# Patient Record
Sex: FEMALE | Race: WHITE | NOT HISPANIC OR LATINO | Employment: OTHER | URBAN - METROPOLITAN AREA
[De-identification: names, ages, dates, MRNs, and addresses within clinical notes are randomized per-mention and may not be internally consistent; named-entity substitution may affect disease eponyms.]

---

## 2017-01-05 ENCOUNTER — OUTPATIENT CASE MANAGEMENT (OUTPATIENT)
Dept: ADMINISTRATIVE | Facility: OTHER | Age: 72
End: 2017-01-05

## 2017-01-05 NOTE — PROGRESS NOTES
1-5-17--Follow-up for OPCM completed. I spoke with patient. Patient reports that she is currently admitted into the [facility] since 12-9-16. Patient reports that she has been accepted to [facility] and she will be going their on Monday 1-9-17. Patient reports that she plans to enter into [facility] prior to her Right Hip replacement surgery scheduled with  at [facility] on 1-31-17. Patient reports that she was instructed that after her surgery she will be in the hospital for 3 days and then will transition to [facility] for Rehab.  Patient reports that she uses a walker to aid with ambulation. Patient reports that she has not had any falls. Patient reports that she can bear a little weight on her right leg, but not much. Patient reports that she does not have any S/S of UTI at this time. We went over S/S of UTI and when to notify MD.     Follow-up Plan:    -Continue to educate about UTI. Review signs and symptoms of UTI. Encourage patient to follow medication and treatment regimen. Encourage patient to maintain follow up with doctors.  -Follow-up with patient to see if she has entered into [facility].  -Is patient still scheduled for right hip surgery with Dr.Richard Gallardo on 1-31-17    Kristal RODRIGES CCM

## 2017-01-09 ENCOUNTER — TELEPHONE (OUTPATIENT)
Dept: INTERNAL MEDICINE | Facility: CLINIC | Age: 72
End: 2017-01-09

## 2017-01-09 NOTE — TELEPHONE ENCOUNTER
----- Message from Radha Magaña MA sent at 1/9/2017 11:36 AM CST -----  Contact: self - [phone]  Requesting a copy of her last chest xray results. Please fax to Attn: Anjel Wray [phone]. ([facility]). Thanks!

## 2017-01-11 ENCOUNTER — OFFICE VISIT (OUTPATIENT)
Dept: INTERNAL MEDICINE | Facility: CLINIC | Age: 72
End: 2017-01-11
Payer: MEDICARE

## 2017-01-11 VITALS
HEIGHT: 66 IN | SYSTOLIC BLOOD PRESSURE: 120 MMHG | WEIGHT: 175.25 LBS | HEART RATE: 93 BPM | BODY MASS INDEX: 28.16 KG/M2 | DIASTOLIC BLOOD PRESSURE: 72 MMHG

## 2017-01-11 DIAGNOSIS — Z01.818 PRE-OP EXAMINATION: Primary | ICD-10-CM

## 2017-01-11 DIAGNOSIS — M16.11 PRIMARY OSTEOARTHRITIS OF RIGHT HIP: ICD-10-CM

## 2017-01-11 DIAGNOSIS — J84.10 PULMONARY FIBROSIS: Chronic | ICD-10-CM

## 2017-01-11 DIAGNOSIS — I10 ESSENTIAL HYPERTENSION: ICD-10-CM

## 2017-01-11 PROCEDURE — 3078F DIAST BP <80 MM HG: CPT | Mod: S$GLB,,, | Performed by: INTERNAL MEDICINE

## 2017-01-11 PROCEDURE — 99499 UNLISTED E&M SERVICE: CPT | Mod: S$GLB,,, | Performed by: INTERNAL MEDICINE

## 2017-01-11 PROCEDURE — 3074F SYST BP LT 130 MM HG: CPT | Mod: S$GLB,,, | Performed by: INTERNAL MEDICINE

## 2017-01-11 PROCEDURE — 1157F ADVNC CARE PLAN IN RCRD: CPT | Mod: S$GLB,,, | Performed by: INTERNAL MEDICINE

## 2017-01-11 PROCEDURE — 99999 PR PBB SHADOW E&M-EST. PATIENT-LVL III: CPT | Mod: PBBFAC,,, | Performed by: INTERNAL MEDICINE

## 2017-01-11 PROCEDURE — 1160F RVW MEDS BY RX/DR IN RCRD: CPT | Mod: S$GLB,,, | Performed by: INTERNAL MEDICINE

## 2017-01-11 PROCEDURE — 99215 OFFICE O/P EST HI 40 MIN: CPT | Mod: S$GLB,,, | Performed by: INTERNAL MEDICINE

## 2017-01-11 PROCEDURE — 1159F MED LIST DOCD IN RCRD: CPT | Mod: S$GLB,,, | Performed by: INTERNAL MEDICINE

## 2017-01-11 PROCEDURE — 1126F AMNT PAIN NOTED NONE PRSNT: CPT | Mod: S$GLB,,, | Performed by: INTERNAL MEDICINE

## 2017-01-11 NOTE — PROGRESS NOTES
Subjective:       Patient ID: Josi Sidhu is a 72 y.o. female.    Chief Complaint: Follow-up  this 72-year-old woman is brought to the office today by a friend.  She was initially seen December 7  See note    THE PURPOSE OF TODAY'S VISIT IS TO PRE-OP CLEAR HER FOR SURGERY AT [facility]   ON January 31, 2017 FOR A RIGHT TOTAL HIP REPLACEMENT, UTILIZING AN ANTERIOR APPROACH WITH DR. PONCE MCGILL.    She looks like a totally new person.  After she was seen December 7, 2016,she entered skilled nursing at the [facility], which cost $6000 for the month and was worth every Elva.  She has improved substantially.    She got off oxycodone completely and very quickly with the use of a topical pain reliever in cream form along with oral Tylenol.      I wonder if she has fibromyalgia and taking narcotics increased her sensitivity to pain, Making her utterly miserable?    But no doubt about it, the side effects of narcotic pain pills were definitely doing her in.  She was constipated, which caused her difficulty emptying her bladder   and she had one urinary tract infection after the other.  Off the narcotics, she's been having a bowel movement every day.  She's not having any trouble emptying her bladder.  She doesn't have any stinging or burning on urination.  She's had no trouble with her bladder function and no lower urinary tract symptoms for the past month, off narcotics.    She has been daily in a vigorous rehabilitation program.    Although she cannot bear weight on her right leg because she has avascular necrosis of the right femoral head and destruction of her hip joint and is urgently in need of a right total hip replacement, Her overall performance status has improved dramatically.  She is independent with all activities of daily living.      So,Yesterday she left the skilled nursing at [facility]  and moved into [facility].  This only costs $3,000.00  dollars a month.   She is  going to have to do her rehabilitation exercises on her own.  She's got to keep her performance status up.  She has to force herself to drink water and empty her bladder regularly of voiding her bladder becoming overfilled.  She's got to watch her diet so that she remains regular and doesn't get constipated again.    Her blood pressure has been under very good control.  HPI  Review of Systems   Constitutional: Negative for activity change, appetite change, chills, fatigue, fever and unexpected weight change.   HENT: Negative for dental problem, hearing loss, tinnitus, trouble swallowing and voice change.    Eyes: Negative for visual disturbance.   Respiratory: Negative for cough, chest tightness, shortness of breath and wheezing.    Cardiovascular: Negative for chest pain, palpitations and leg swelling.   Gastrointestinal: Negative for abdominal pain, blood in stool, constipation, diarrhea and nausea.   Genitourinary: Negative for difficulty urinating, dysuria, frequency and urgency.   Musculoskeletal: Positive for arthralgias and gait problem. Negative for back pain, joint swelling, myalgias, neck pain and neck stiffness.   Skin: Negative for rash.   Neurological: Negative for dizziness, tremors, speech difficulty, weakness, light-headedness and headaches.   Psychiatric/Behavioral: Negative for dysphoric mood and sleep disturbance. The patient is not nervous/anxious.        Objective:      Physical Exam   Constitutional: She is oriented to person, place, and time. She appears well-developed and well-nourished. No distress.   HENT:   Head: Normocephalic and atraumatic.   Right Ear: External ear normal.   Left Ear: External ear normal.   Nose: Nose normal.   Mouth/Throat: Oropharynx is clear and moist. No oropharyngeal exudate.   Eyes: Conjunctivae and EOM are normal. Pupils are equal, round, and reactive to light. Right eye exhibits no discharge. No scleral icterus.   Neck: Normal range of motion and full passive  range of motion without pain. Neck supple. No spinous process tenderness and no muscular tenderness present. Carotid bruit is not present. No thyromegaly present.   Cardiovascular: Normal rate, regular rhythm, S1 normal, S2 normal and normal heart sounds.  Exam reveals no gallop and no friction rub.    No murmur heard.  Pulmonary/Chest: Effort normal. No respiratory distress. She has no wheezes. She has rales. She exhibits no tenderness.   She has a strikingly abnormal pulmonary examination with dry crackles throughout both lungs from the bases to the apices.   Abdominal: Soft. Bowel sounds are normal. She exhibits no distension and no mass. There is no tenderness. There is no rebound and no guarding.   Genitourinary: Pelvic exam was performed with patient supine. Uterus is not deviated, not enlarged, not fixed and not tender. Cervix exhibits no motion tenderness, no discharge and no friability. Right adnexum displays no mass, no tenderness and no fullness. Left adnexum displays no mass, no tenderness and no fullness.   Musculoskeletal: Normal range of motion. She exhibits no edema or tenderness.   Lymphadenopathy:        Head (right side): No submental and no submandibular adenopathy present.        Head (left side): No submental and no submandibular adenopathy present.     She has no cervical adenopathy.        Right cervical: No superficial cervical, no deep cervical and no posterior cervical adenopathy present.       Left cervical: No superficial cervical, no deep cervical and no posterior cervical adenopathy present.        Right axillary: No pectoral and no lateral adenopathy present.        Left axillary: No pectoral and no lateral adenopathy present.       Right: No supraclavicular adenopathy present.        Left: No supraclavicular adenopathy present.   Neurological: She is alert and oriented to person, place, and time. She has normal reflexes. No cranial nerve deficit. She exhibits normal muscle tone.  Coordination normal.   Skin: Skin is warm and dry. No rash noted.   Psychiatric: She has a normal mood and affect. Her behavior is normal. Her mood appears not anxious. Her speech is not rapid and/or pressured. She is not agitated. She does not exhibit a depressed mood.   Normal behavior, thought content, insight and judgement.   Vitals reviewed.      Assessment:       1. Pre-op examination, Dr. Ata Gallardo, at [facility] 01-    2. Primary osteoarthritis of right hip, avascular necrosis, cause unknown, end stage    3. Pulmonary fibrosis, diagnosis 2010    4. Essential hypertension        Plan:   Josi was seen today for follow-up.    Diagnoses and all orders for this visit:    Pre-op examination, Dr. Ata Gallardo, at [facility] 01-.  See below.  All in all, I think she is at an acceptable risk to proceed with right total hip replacement as soon as possible.  I do think a pulmonary evaluation would be valuable pre-op.    Primary osteoarthritis of right hip, avascular necrosis, cause unknown, end stage.   This has become a serious, quality of life issue and it is imperative that the patient have her right hip replaced as soon as possible.    She is wheelchair bound for the most part because of this hip.   She can partially weight-bear using a walker.    She is not able to live independently.      She just moved into an assisted living facility yesterday and I had to fill out paperwork for that institution, done at this visit.    Pulmonary fibrosis, diagnosis 2010  Although the patient has a chest x-ray which is scary to look atAnd an incredibly abnormal pulmonary examination, she is amazingly asymptomatic.  She just finished a strenuous rehabilitation program and reportsno significant dyspnea on exertion, Tachycardia on exertion, Lightheadedness on exertion or blood pressure changes on exertion.    She has no nocturnal symptoms.    She sleeps well at night when she gets comfortable with her right hip.  She has  an appointment on Friday, January 13 with Dr. Ata Gallardo her orthopedic surgeon who will perform her right total hip replacement.  I think it would be best for her to have a pulmonary evaluation, Pulmonary function test, arterial blood gases on room airand pulse oximetry prior to her surgery at the institution, [facility], where she is going to have her surgery performed.    I think arterial blood gases on room air would be very valuable pre-op.  -     Complete PFT with bronchodilator; Future    Essential hypertension, Well controlled.  Continue same medications.  Monitor blood pressure regularly at least twice weekly.

## 2017-01-11 NOTE — MR AVS SNAPSHOT
[facility]  [address]  [facility] [address]  Phone: [phone]  Fax: [phone]                  Josi Albertseverett   2017 3:20 PM   Office Visit    Description:  Female : 1945   Provider:  Hoa Orantes MD   Department:  [facility]           Reason for Visit     Follow-up           Diagnoses this Visit        Comments    Pre-op examination    -  Primary     Primary osteoarthritis of right hip         Pulmonary fibrosis         Essential hypertension                To Do List           Goals (5 Years of Data)              17    Patient/caregiver will have an action plan in place to manage and prevent complications of UTI  prior to discharge from OPCM.   On track    Notes - Note created  2016  2:25 PM by Aiyana Gomez RN    Overall Time to Completion  1 month from 2016    Short Term Goals  Patient/caregiver will verbalize 2 signs and symptoms of UTI within 2 weeks.   Interventions   · Recognize and provide educational material (KRYSTAL).   Status  · Partially met          Patient/caregiver will have appropriate level of care prior to discharge from OPCM.   Improving    Notes - Note created  2016  2:27 PM by Aiyana Gomez RN    Overall Time to Completion  1 month from 2016    Short Term Goals  Patient/caregiver will identify 2 supports or services to maintain or improve current functional status within 2 weeks.  Interventions   · Assist as needed with process for gaining residence within and Assisted living    Status  · Partially met              [facility]     [facility] -  Assistance  Registered nurses in the [facility] provide clinical advisement, health education, appointment booking, and other advisory services.  Call for this free service at [phone].             Medications           Message regarding Medications     Verify the changes and/or additions to  "your medication regime listed below are the same as discussed with your clinician today.  If any of these changes or additions are incorrect, please notify your healthcare provider.        STOP taking these medications     oxycodone (ROXICODONE) 5 MG immediate release tablet Take 1 tablet (5 mg total) by mouth every 6 (six) hours as needed.           Verify that the below list of medications is an accurate representation of the medications you are currently taking.  If none reported, the list may be blank. If incorrect, please contact your healthcare provider. Carry this list with you in case of emergency.           Current Medications     acetaminophen (TYLENOL) 500 MG tablet Take 2 tablets (1,000 mg total) by mouth every 8 (eight) hours as needed for Pain.    amlodipine (NORVASC) 5 MG tablet Take 1 tablet (5 mg total) by mouth every morning.    cyanocobalamin (VITAMIN B-12) 1000 MCG tablet Take 1,000 mcg by mouth once daily.    lisinopril (PRINIVIL,ZESTRIL) 40 MG tablet Take 1 tablet (40 mg total) by mouth once daily.    omeprazole (PRILOSEC) 20 MG capsule Take 1 capsule (20 mg total) by mouth 2 (two) times daily. 1 Capsule, Delayed Release(E.C.) Oral Twice a day    trazodone (DESYREL) 50 MG tablet Take 50 mg by mouth every evening.    vitamin D (VITAMIN D3) 1000 units Tab Take 1,000 Units by mouth 2 (two) times daily.    CMPD ketamine 10%- baclofen 2%- cyclobenzaprine 2%- cascade diclofenac 3%- gabapentin 10%- bupivacaine 2% in lipoderm cream Apply topically 1 to 2 grams to the affected area 3 to 4 times daily           Clinical Reference Information           Vital Signs - Last Recorded  Most recent update: 1/11/2017  3:51 PM by Sapna العراقي MA    BP Pulse Ht Wt LMP BMI    120/72 93 5' 6" (1.676 m) 79.5 kg (175 lb 4.3 oz) (LMP Unknown) 28.29 kg/m2      Blood Pressure          Most Recent Value    BP  120/72      Allergies as of 1/11/2017     Asa [Aspirin]    Nsaids (Non-steroidal Anti-inflammatory Drug) "      Immunizations Administered on Date of Encounter - 1/11/2017     None      Orders Placed During Today's Visit     Future Labs/Procedures Expected by Expires    Complete PFT with bronchodilator  As directed 4/11/2017

## 2017-01-12 ENCOUNTER — TELEPHONE (OUTPATIENT)
Dept: INTERNAL MEDICINE | Facility: CLINIC | Age: 72
End: 2017-01-12

## 2017-01-19 ENCOUNTER — OUTPATIENT CASE MANAGEMENT (OUTPATIENT)
Dept: ADMINISTRATIVE | Facility: OTHER | Age: 72
End: 2017-01-19

## 2017-01-19 NOTE — PROGRESS NOTES
1-19-17-Attempt to follow-up for Outpatient Care Management; left message requesting return call.         Follow-up Plan:    -Continue to educate about UTI. Review signs and symptoms of UTI. Encourage patient to follow medication and treatment regimen. Encourage patient to maintain follow up with doctors.  -Follow-up with patient to see if [facility] entered into [facility].  -Is patient still scheduled for right hip surgery with Dr.Richard Gallardo on 1-31-17    Kristal RODRIGES CCM

## 2017-01-23 ENCOUNTER — OUTPATIENT CASE MANAGEMENT (OUTPATIENT)
Dept: ADMINISTRATIVE | Facility: OTHER | Age: 72
End: 2017-01-23

## 2017-01-23 NOTE — PROGRESS NOTES
1-23-17--Follow-up for OPCM completed. I spoke with patient. Patient reports that she is currently admitted into the [facility].  Patient reports that she plans to [facility] into [facility] prior to her Right Hip replacement surgery scheduled with  at [facility] on 1-31-17. Patient reports that she was instructed that after her surgery she will be in the hospital for 3 days and then will transition to [facility] for Rehab.  Patient reports that she uses a walker to aid with ambulation. Patient reports that she has not had any falls. Patient reports that she can bear a little weight on her right leg, but not much. Patient reports that she does not have any S/S of UTI at this time. We went over S/S of UTI and when to notify MD.     Follow-up Plan:    -Continue to educate about UTI. Review signs and symptoms of UTI. Encourage patient to follow medication and treatment regimen. Encourage patient to maintain follow up with doctors.  -Follow-up with patient to see if [facility] has [facility] into [facility].  -Is patient still scheduled for right hip surgery with Dr.Richard Gallardo on 1-31-17    Kristal RODRIGES CCM

## 2017-02-13 ENCOUNTER — TELEPHONE (OUTPATIENT)
Dept: ORTHOPEDICS | Facility: CLINIC | Age: 72
End: 2017-02-13

## 2017-02-13 NOTE — TELEPHONE ENCOUNTER
----- Message from Marzena Simon MA sent at 2/13/2017  1:32 PM CST -----  Contact: jessica( [facility]) [phone]  I think this is an [facility] patient.     ----- Message -----     From: Kathleen Berman     Sent: 2/13/2017   1:25 PM       To: Sami Raygoza S Staff    She is calling to see if the dreesinf should be changed on the pt  visit.

## 2017-02-15 ENCOUNTER — OUTPATIENT CASE MANAGEMENT (OUTPATIENT)
Dept: ADMINISTRATIVE | Facility: OTHER | Age: 72
End: 2017-02-15

## 2017-02-15 NOTE — PROGRESS NOTES
2-15-17--Attempt to follow-up for Outpatient Care Management; left message requesting return call.       Follow-up Plan:    -Continue to educate about UTI. Review signs and symptoms of UTI. Encourage patient to follow medication and treatment regimen. Encourage patient to maintain follow up with doctors.  -Follow-up with patient to see if she has entered into [facility].  -Is patient still scheduled for right hip surgery with Dr.Richard Gallardo on 1-31-17    Kristal RODRIGES CCM

## 2017-02-20 ENCOUNTER — OUTPATIENT CASE MANAGEMENT (OUTPATIENT)
Dept: ADMINISTRATIVE | Facility: OTHER | Age: 72
End: 2017-02-20

## 2017-02-20 NOTE — PROGRESS NOTES
2-20-17--2nd Attempt to follow-up for Outpatient Care Management; I spoke briefly with patient. Patient is at the doctors and request return call at later date.I will mail a letter with my contact information.Kristal RODRIGES [facility]        Follow-up Plan:    -Continue to educate about UTI. Review signs and symptoms of UTI. Encourage patient to follow medication and treatment regimen. Encourage patient to maintain follow up with doctors.  -Follow-up with patient to see if she has entered into [facility].  -Is patient still scheduled for right hip surgery with Dr.Richard Gallardo on 1-31-17    Krisatl RODRIGES [facility]

## 2017-03-01 ENCOUNTER — OUTPATIENT CASE MANAGEMENT (OUTPATIENT)
Dept: ADMINISTRATIVE | Facility: OTHER | Age: 72
End: 2017-03-01

## 2017-03-01 NOTE — PROGRESS NOTES
3-1-17--Follow-up for OPCM completed. I spoke with patient. Patient reports that she is doing fine since her Right hip surgery on 1-31-17. Patient reports that she has moved to new apartments on [facility] [facility]. Patient reports that [facility] Patient reports that she will continue with therapy at [facility] once with Home health is discontinued.           Follow-up Plan:    Case closed    Kristal RODRIGES CCM

## 2017-04-05 ENCOUNTER — PATIENT MESSAGE (OUTPATIENT)
Dept: INTERNAL MEDICINE | Facility: CLINIC | Age: 72
End: 2017-04-05

## 2017-04-05 RX ORDER — TRAZODONE HYDROCHLORIDE 50 MG/1
50 TABLET ORAL NIGHTLY
Qty: 90 TABLET | Refills: 3 | Status: SHIPPED | OUTPATIENT
Start: 2017-04-05 | End: 2018-01-31 | Stop reason: SDUPTHER

## 2017-07-10 RX ORDER — OMEPRAZOLE 20 MG/1
CAPSULE, DELAYED RELEASE ORAL
Qty: 180 CAPSULE | Refills: 3 | Status: SHIPPED | OUTPATIENT
Start: 2017-07-10 | End: 2017-10-11 | Stop reason: SDUPTHER

## 2017-07-29 ENCOUNTER — PATIENT MESSAGE (OUTPATIENT)
Dept: INTERNAL MEDICINE | Facility: CLINIC | Age: 72
End: 2017-07-29

## 2017-07-29 DIAGNOSIS — I10 HYPERTENSION, BENIGN: ICD-10-CM

## 2017-08-01 RX ORDER — LISINOPRIL 40 MG/1
40 TABLET ORAL DAILY
Qty: 90 TABLET | Refills: 3 | Status: SHIPPED | OUTPATIENT
Start: 2017-08-01 | End: 2017-10-11 | Stop reason: ALTCHOICE

## 2017-08-01 RX ORDER — AMLODIPINE BESYLATE 5 MG/1
5 TABLET ORAL EVERY MORNING
Qty: 90 TABLET | Refills: 3 | Status: SHIPPED | OUTPATIENT
Start: 2017-08-01 | End: 2017-10-11 | Stop reason: SDUPTHER

## 2017-10-02 ENCOUNTER — TELEPHONE (OUTPATIENT)
Dept: INTERNAL MEDICINE | Facility: CLINIC | Age: 72
End: 2017-10-02

## 2017-10-02 NOTE — TELEPHONE ENCOUNTER
----- Message from Joan Jeffrey sent at 10/2/2017  3:37 PM CDT -----  Contact: Pt  Pt is requesting to schedule a visit says she is not feeling well    Pt can be reached at [phone]    Thanks

## 2017-10-03 ENCOUNTER — TELEPHONE (OUTPATIENT)
Dept: INTERNAL MEDICINE | Facility: CLINIC | Age: 72
End: 2017-10-03

## 2017-10-03 ENCOUNTER — HOSPITAL ENCOUNTER (OUTPATIENT)
Dept: RADIOLOGY | Facility: HOSPITAL | Age: 72
Discharge: HOME | End: 2017-10-03
Attending: HOSPITALIST
Payer: MEDICARE

## 2017-10-03 ENCOUNTER — OFFICE VISIT (OUTPATIENT)
Dept: INTERNAL MEDICINE | Facility: CLINIC | Age: 72
End: 2017-10-03
Payer: MEDICARE

## 2017-10-03 VITALS
WEIGHT: 178.38 LBS | OXYGEN SATURATION: 97 % | BODY MASS INDEX: 28.67 KG/M2 | HEART RATE: 96 BPM | DIASTOLIC BLOOD PRESSURE: 60 MMHG | TEMPERATURE: 98 F | SYSTOLIC BLOOD PRESSURE: 109 MMHG | HEIGHT: 66 IN

## 2017-10-03 DIAGNOSIS — R05.9 COUGH: ICD-10-CM

## 2017-10-03 DIAGNOSIS — J84.10 PULMONARY FIBROSIS: Chronic | ICD-10-CM

## 2017-10-03 DIAGNOSIS — I10 ESSENTIAL HYPERTENSION: ICD-10-CM

## 2017-10-03 DIAGNOSIS — R05.9 COUGH: Primary | ICD-10-CM

## 2017-10-03 DIAGNOSIS — R35.0 URINARY FREQUENCY: ICD-10-CM

## 2017-10-03 PROCEDURE — 71020 XR CHEST PA AND LATERAL: CPT | Mod: TC

## 2017-10-03 PROCEDURE — 71020 XR CHEST PA AND LATERAL: CPT | Mod: 26,,, | Performed by: RADIOLOGY

## 2017-10-03 PROCEDURE — 99213 OFFICE O/P EST LOW 20 MIN: CPT | Mod: GC,S$GLB,, | Performed by: HOSPITALIST

## 2017-10-03 PROCEDURE — 99999 PR PBB SHADOW E&M-EST. PATIENT-LVL IV: CPT | Mod: PBBFAC,GC,, | Performed by: HOSPITALIST

## 2017-10-03 RX ORDER — LEVOFLOXACIN 750 MG/1
750 TABLET ORAL DAILY
Qty: 7 TABLET | Refills: 0 | Status: SHIPPED | OUTPATIENT
Start: 2017-10-03 | End: 2017-10-11 | Stop reason: ALTCHOICE

## 2017-10-03 NOTE — PROGRESS NOTES
Subjective:       Patient ID: Josi Sidhu is a 72 y.o. female.    Chief Complaint: Cough and Extremity Weakness    71 y/o F with hx of pulmonary fibrosis and HTN who presents as an urgent care visit for 6 day hx of cough. Cough started on 9/27, non-productive, non-bloody, progressively worsening. Associated with some mild rhinorrhea , night sweats, malaise/ fatigue. No associated fevers. No lightheadedness or dizziness. Has baseline SOB and MONAE which has not worsened during this time frame.  Developed diarrhea for a couple of days that resolved with OTC meds. Took bactrim (that was left over from prior UTI treatment) for 4 days, with no significant improvement. Had no taken other OTC cough meds. On lisinopril for 30 years. Diarrhea started prior to abx. Had some urinary frequency with no dysuria today.       Review of Systems   Constitutional: Positive for activity change, appetite change, chills and fatigue. Negative for fever and unexpected weight change.   HENT: Negative for congestion, postnasal drip, sinus pressure and sore throat.    Eyes: Negative for visual disturbance.   Respiratory: Positive for cough and shortness of breath. Negative for wheezing.    Cardiovascular: Negative for chest pain, palpitations and leg swelling.   Gastrointestinal: Positive for abdominal pain and diarrhea. Negative for abdominal distention, nausea and vomiting.   Genitourinary: Positive for flank pain and frequency. Negative for decreased urine volume, difficulty urinating and dysuria.   Musculoskeletal: Negative for arthralgias and myalgias.   Neurological: Negative for dizziness, syncope, weakness and light-headedness.   Psychiatric/Behavioral: Negative for decreased concentration and dysphoric mood.       Past Medical History:   Diagnosis Date    History of tobacco abuse     remote, quit 1983, smoked PPD 13 days    Hyperlipidemia     Hypertension     since age 39    Pulmonary fibrosis         Recent  "childbirth      age age [age] normal, c-sec, cord     Past Surgical History:   Procedure Laterality Date    CATARACT EXTRACTION      both done but five years apart     SECTION      x2    ELBOW SURGERY Right 1995    fractured         Objective:     /60   Pulse 96   Temp 97.9 °F (36.6 °C)   Ht 5' 6" (1.676 m)   Wt 80.9 kg (178 lb 5.6 oz)   LMP  (LMP Unknown)   SpO2 97%   BMI 28.79 kg/m²     Physical Exam   Constitutional: She is oriented to person, place, and time. She appears well-developed and well-nourished. No distress.   Intermittently coughing    HENT:   Head: Normocephalic and atraumatic.   Right Ear: External ear normal.   Left Ear: External ear normal.   Eyes: EOM are normal. Pupils are equal, round, and reactive to light.   Neck: Normal range of motion. Neck supple.   Cardiovascular: Normal rate, regular rhythm, normal heart sounds and intact distal pulses.    No murmur heard.  Pulmonary/Chest: No respiratory distress. She has no wheezes. She has rales.   Diffuse bilateral Velcro crackles , worse at lung bases (per pt, has been noted on PE in the past)    Abdominal: Soft. Bowel sounds are normal. She exhibits no distension. There is no tenderness.   Musculoskeletal: Normal range of motion. She exhibits no edema, tenderness or deformity.   Lymphadenopathy:     She has no cervical adenopathy.   Neurological: She is alert and oriented to person, place, and time. No cranial nerve deficit.   Skin: Skin is warm and dry. No rash noted. She is not diaphoretic.   Psychiatric: She has a normal mood and affect.       Assessment:       1. Cough    2. Pulmonary fibrosis, diagnosis     3. Essential hypertension    4. Urinary frequency        Plan:       1. Cough  - ddx infectious (viral vs bacterial such as CAP) vs less likely ACEI adverse effects   - X-Ray Chest PA And Lateral; Future  - levoFLOXacin (LEVAQUIN) 750 MG tablet; Take 1 tablet (750 mg total) by mouth once daily.  Dispense: 7 " tablet; Refill: 0  - CBC auto differential  - dextromethorphan-guaifenesin  mg (MUCINEX DM)  mg per 12 hr tablet; Take 1 tablet by mouth 2 (two) times daily.  - pt advised to seek help in the ED if develops worsening symptoms, increasing SOB, fevers, or worsening fatigeu     2. Pulmonary fibrosis, diagnosis 2010  - X-Ray Chest PA And Lateral; Future  - CBC auto differential; Future    3. Essential hypertension  - cont amlodipine 5mg  - cont lisinopril 40 mg at this time  - pt advised to check BP at home and hold meds if SBP at 100 or lower    4. Urinary frequency  - clinic POCT U[facility] wnl    Discussed with Dr Caro    F/u with PCP (Rolanda) or resident clinic in 1 week    aSrah Coyle MD  Internal Medicine PGY-3  Pager [phone]

## 2017-10-04 ENCOUNTER — TELEPHONE (OUTPATIENT)
Dept: INTERNAL MEDICINE | Facility: CLINIC | Age: 72
End: 2017-10-04

## 2017-10-04 NOTE — TELEPHONE ENCOUNTER
"Called pt regarding lab work and CXR    CXR showing "Chronic lung disease slowly worsening since chest radiographs dated 2011 and 2009. " no evidence of a consolidation  CBC with normal WBC , 19% monocytes  She picked up her Marlyn Coyle MD  Internal Medicine PGY-3  Pager [phone]      "

## 2017-10-10 NOTE — PROGRESS NOTES
Preceptor note    Patient's history and physical discussed, please refer to resident physician's note for specific details. Medical record reviewed. I agree with resident's assessment and plan.

## 2017-10-11 ENCOUNTER — OFFICE VISIT (OUTPATIENT)
Dept: INTERNAL MEDICINE | Facility: CLINIC | Age: 72
End: 2017-10-11
Payer: MEDICARE

## 2017-10-11 ENCOUNTER — IMMUNIZATION (OUTPATIENT)
Dept: INTERNAL MEDICINE | Facility: CLINIC | Age: 72
End: 2017-10-11
Payer: MEDICARE

## 2017-10-11 VITALS
HEART RATE: 88 BPM | HEIGHT: 66 IN | WEIGHT: 176.56 LBS | SYSTOLIC BLOOD PRESSURE: 100 MMHG | DIASTOLIC BLOOD PRESSURE: 64 MMHG | OXYGEN SATURATION: 91 % | BODY MASS INDEX: 28.38 KG/M2

## 2017-10-11 DIAGNOSIS — R05.9 COUGH: ICD-10-CM

## 2017-10-11 DIAGNOSIS — Z78.0 POSTMENOPAUSAL STATUS: ICD-10-CM

## 2017-10-11 DIAGNOSIS — Z96.641 H/O TOTAL HIP ARTHROPLASTY, RIGHT: ICD-10-CM

## 2017-10-11 DIAGNOSIS — I10 ESSENTIAL HYPERTENSION: ICD-10-CM

## 2017-10-11 DIAGNOSIS — I10 HYPERTENSION, BENIGN: ICD-10-CM

## 2017-10-11 DIAGNOSIS — R05.8 COUGH DUE TO ACE INHIBITOR: ICD-10-CM

## 2017-10-11 DIAGNOSIS — E78.2 MIXED HYPERLIPIDEMIA: Chronic | ICD-10-CM

## 2017-10-11 DIAGNOSIS — Z12.31 ENCOUNTER FOR SCREENING MAMMOGRAM FOR MALIGNANT NEOPLASM OF BREAST: ICD-10-CM

## 2017-10-11 DIAGNOSIS — T46.4X5A COUGH DUE TO ACE INHIBITOR: ICD-10-CM

## 2017-10-11 DIAGNOSIS — J84.10 PULMONARY FIBROSIS: Primary | Chronic | ICD-10-CM

## 2017-10-11 PROCEDURE — 99214 OFFICE O/P EST MOD 30 MIN: CPT | Mod: 25,S$GLB,, | Performed by: INTERNAL MEDICINE

## 2017-10-11 PROCEDURE — 99499 UNLISTED E&M SERVICE: CPT | Mod: S$GLB,,, | Performed by: INTERNAL MEDICINE

## 2017-10-11 PROCEDURE — 90662 IIV NO PRSV INCREASED AG IM: CPT | Mod: S$GLB,,, | Performed by: INTERNAL MEDICINE

## 2017-10-11 PROCEDURE — 94640 AIRWAY INHALATION TREATMENT: CPT | Mod: S$GLB,,, | Performed by: INTERNAL MEDICINE

## 2017-10-11 PROCEDURE — 99999 PR PBB SHADOW E&M-EST. PATIENT-LVL IV: CPT | Mod: PBBFAC,,, | Performed by: INTERNAL MEDICINE

## 2017-10-11 PROCEDURE — G0008 ADMIN INFLUENZA VIRUS VAC: HCPCS | Mod: S$GLB,,, | Performed by: INTERNAL MEDICINE

## 2017-10-11 RX ORDER — ALBUTEROL SULFATE 0.83 MG/ML
2.5 SOLUTION RESPIRATORY (INHALATION)
Status: DISCONTINUED | OUTPATIENT
Start: 2017-10-11 | End: 2017-10-11 | Stop reason: SDUPTHER

## 2017-10-11 RX ORDER — IRBESARTAN 300 MG/1
300 TABLET ORAL NIGHTLY
Qty: 90 TABLET | Refills: 3 | Status: SHIPPED | OUTPATIENT
Start: 2017-10-11 | End: 2018-09-19 | Stop reason: SDUPTHER

## 2017-10-11 RX ORDER — ALBUTEROL SULFATE 2.5 MG/.5ML
2.5 SOLUTION RESPIRATORY (INHALATION)
Status: SHIPPED | OUTPATIENT
Start: 2017-10-11

## 2017-10-11 RX ORDER — OMEPRAZOLE 20 MG/1
20 CAPSULE, DELAYED RELEASE ORAL EVERY MORNING
Qty: 90 CAPSULE | Refills: 3 | Status: SHIPPED | OUTPATIENT
Start: 2017-10-11 | End: 2017-11-15 | Stop reason: SDUPTHER

## 2017-10-11 RX ORDER — AMLODIPINE BESYLATE 5 MG/1
5 TABLET ORAL EVERY MORNING
Qty: 90 TABLET | Refills: 3 | Status: SHIPPED | OUTPATIENT
Start: 2017-10-11 | End: 2019-01-09 | Stop reason: SDUPTHER

## 2017-10-11 RX ADMIN — ALBUTEROL SULFATE 2.5 MG: 0.83 SOLUTION RESPIRATORY (INHALATION) at 01:10

## 2017-10-11 NOTE — PROGRESS NOTES
Subjective:       Patient ID: Josi Sidhu is a 72 y.o. female.    Chief Complaint: Follow-up  She is definitely having more trouble with shortness of breath.    We tried an albuterol per nebulizer treatment in the office but she did not notice any benefit.    She's doing a beautiful job recovering from the right total hip replacement.    She has moved into [facility] on [facility].  It has been a good move.  HPI  Review of Systems   Constitutional: Negative for activity change, appetite change, chills, fatigue, fever and unexpected weight change.   HENT: Negative for dental problem, hearing loss, tinnitus, trouble swallowing and voice change.    Eyes: Negative for visual disturbance.   Respiratory: Positive for cough and shortness of breath. Negative for chest tightness and wheezing.    Cardiovascular: Negative for chest pain, palpitations and leg swelling.   Gastrointestinal: Negative for abdominal pain, blood in stool, constipation, diarrhea and nausea.   Genitourinary: Negative for difficulty urinating, dysuria, frequency and urgency.   Musculoskeletal: Negative for arthralgias, back pain, gait problem, joint swelling, myalgias, neck pain and neck stiffness.   Skin: Negative for rash.   Neurological: Negative for dizziness, tremors, speech difficulty, weakness, light-headedness and headaches.   Psychiatric/Behavioral: Negative for dysphoric mood and sleep disturbance. The patient is not nervous/anxious.        Objective:      Physical Exam   Constitutional: She is oriented to person, place, and time. She appears well-developed and well-nourished. No distress.   HENT:   Head: Normocephalic and atraumatic.   Right Ear: External ear normal.   Left Ear: External ear normal.   Nose: Nose normal.   Mouth/Throat: Oropharynx is clear and moist. No oropharyngeal exudate.   Eyes: Conjunctivae and EOM are normal. Pupils are equal, round, and reactive to light. Right eye exhibits no discharge. No scleral  icterus.   Neck: Normal range of motion and full passive range of motion without pain. Neck supple. No spinous process tenderness and no muscular tenderness present. Carotid bruit is not present. No thyromegaly present.   Cardiovascular: Normal rate, regular rhythm, S1 normal, S2 normal, normal heart sounds and intact distal pulses.  Exam reveals no gallop and no friction rub.    No murmur heard.  Pulmonary/Chest: Effort normal. No respiratory distress. She has no wheezes. She has rales. She exhibits no tenderness.   She has an incredibly abnormal pulmonary examination consistent with advanced pulmonary fibrosis from the bases to the apices bilaterally.  No wheezes are heard.  She is not obviously short of breath at rest.   Abdominal: Soft. Bowel sounds are normal. She exhibits no distension and no mass. There is no tenderness. There is no rebound and no guarding.   Genitourinary: Pelvic exam was performed with patient supine. Uterus is not deviated, not enlarged, not fixed and not tender. Cervix exhibits no motion tenderness, no discharge and no friability. Right adnexum displays no mass, no tenderness and no fullness. Left adnexum displays no mass, no tenderness and no fullness.   Musculoskeletal: Normal range of motion. She exhibits no edema or tenderness.   Lymphadenopathy:        Head (right side): No submental and no submandibular adenopathy present.        Head (left side): No submental and no submandibular adenopathy present.     She has no cervical adenopathy.        Right cervical: No superficial cervical, no deep cervical and no posterior cervical adenopathy present.       Left cervical: No superficial cervical, no deep cervical and no posterior cervical adenopathy present.        Right axillary: No pectoral and no lateral adenopathy present.        Left axillary: No pectoral and no lateral adenopathy present.       Right: No supraclavicular adenopathy present.        Left: No supraclavicular adenopathy  present.   Neurological: She is alert and oriented to person, place, and time. She has normal reflexes. No cranial nerve deficit. She exhibits normal muscle tone. Coordination normal.   Skin: Skin is warm and dry. No rash noted.   Psychiatric: She has a normal mood and affect. Her behavior is normal. Her mood appears not anxious. Her speech is not rapid and/or pressured. She is not agitated. She does not exhibit a depressed mood.   Normal behavior, thought content, insight and judgement.       Assessment:       1. Pulmonary fibrosis, diagnosis 2010    2. H/O total hip arthroplasty, right, 01-    3. Mixed hyperlipidemia    4. Essential hypertension    5. Cough    6. Hypertension, benign    7. Cough due to ACE inhibitor?    8. Encounter for screening mammogram for malignant neoplasm of breast    9. Postmenopausal status        Plan:   Josi was seen today for follow-up.    Diagnoses and all orders for this visit:    Pulmonary fibrosis, diagnosis 2010.  She will consider oxygen replacement therapy, other therapies.  However,the bronchodilator did not appear to be of any benefit.  -     Ambulatory Referral to Pulmonology  -     Discontinue: albuterol nebulizer solution 2.5 mg; Take 3 mLs (2.5 mg total) by nebulization one time.    H/O total hip arthroplasty, right, 01-.  Congratulations on a Rapid  Recovery.    Mixed hyperlipidemia  Encouraged continued compliance with diet, medication and lifestyle.    Essential hypertension.  Blood pressure might be a little lo w   on average, Now that her hip pain has resolved.      Hypertension, benign  -     amlodipine (NORVASC) 5 MG tablet; Take 1 tablet (5 mg total) by mouth every morning.    Cough due to ACE inhibitor?She definitely has a cough.  It is possible that lisinopril could be contributing.  In an attempt to reduce her symptoms lisinopril 40 mg daily will be discontinued and irbesartan started.    Encounter for screening mammogram for malignant neoplasm of  breast  -     Mammo Digital Screening Bilat with CAD; Future    Postmenopausal status  -     DXA Bone Density Spine And Hip; Future    Other orders  -     irbesartan (AVAPRO) 300 MG tablet; Take 1 tablet (300 mg total) by mouth every evening.  -     omeprazole (PRILOSEC) 20 MG capsule; Take 1 capsule (20 mg total) by mouth every morning.  -     albuterol sulfate nebulizer solution 2.5 mg; Take 2.5 mg by nebulization one time.    Return in about 4 weeks (around 11/8/2017) for BP check OFF ace ME OR INCHOLAS.

## 2017-11-08 ENCOUNTER — HOSPITAL ENCOUNTER (OUTPATIENT)
Dept: RADIOLOGY | Facility: HOSPITAL | Age: 72
Discharge: HOME | End: 2017-11-08
Attending: INTERNAL MEDICINE
Payer: MEDICARE

## 2017-11-08 ENCOUNTER — OFFICE VISIT (OUTPATIENT)
Dept: INTERNAL MEDICINE | Facility: CLINIC | Age: 72
End: 2017-11-08
Payer: MEDICARE

## 2017-11-08 VITALS
SYSTOLIC BLOOD PRESSURE: 122 MMHG | OXYGEN SATURATION: 96 % | BODY MASS INDEX: 29.09 KG/M2 | HEIGHT: 66 IN | DIASTOLIC BLOOD PRESSURE: 80 MMHG | WEIGHT: 181 LBS | HEART RATE: 77 BPM

## 2017-11-08 DIAGNOSIS — I10 ESSENTIAL HYPERTENSION: Primary | ICD-10-CM

## 2017-11-08 DIAGNOSIS — Z96.641 H/O TOTAL HIP ARTHROPLASTY, RIGHT: ICD-10-CM

## 2017-11-08 DIAGNOSIS — Z12.31 ENCOUNTER FOR SCREENING MAMMOGRAM FOR MALIGNANT NEOPLASM OF BREAST: ICD-10-CM

## 2017-11-08 DIAGNOSIS — J84.10 PULMONARY FIBROSIS: Chronic | ICD-10-CM

## 2017-11-08 PROCEDURE — 77067 SCR MAMMO BI INCL CAD: CPT | Mod: TC

## 2017-11-08 PROCEDURE — 99499 UNLISTED E&M SERVICE: CPT | Mod: S$GLB,,, | Performed by: PHYSICIAN ASSISTANT

## 2017-11-08 PROCEDURE — 77063 BREAST TOMOSYNTHESIS BI: CPT | Mod: 26,,, | Performed by: STUDENT IN AN ORGANIZED HEALTH CARE EDUCATION/TRAINING PROGRAM

## 2017-11-08 PROCEDURE — 99999 PR PBB SHADOW E&M-EST. PATIENT-LVL III: CPT | Mod: PBBFAC,,, | Performed by: PHYSICIAN ASSISTANT

## 2017-11-08 PROCEDURE — 99214 OFFICE O/P EST MOD 30 MIN: CPT | Mod: S$GLB,,, | Performed by: PHYSICIAN ASSISTANT

## 2017-11-08 PROCEDURE — 77067 SCR MAMMO BI INCL CAD: CPT | Mod: 26,,, | Performed by: STUDENT IN AN ORGANIZED HEALTH CARE EDUCATION/TRAINING PROGRAM

## 2017-11-08 NOTE — PATIENT INSTRUCTIONS

## 2017-11-08 NOTE — PROGRESS NOTES
Subjective:       Patient ID: Josi Sidhu is a 72 y.o. female.        Chief Complaint: Follow-up    Josi Sidhu is an established patient of Hoa Sherman MD here today for 4 week f/u visit.    Changed from Lisinopril to Irbesartan 4 weeks ago and she has noticed a significant improvement in coughing!  Blood pressure today looks great at 122/80.    With pulmonary fibrosis, seeing pulmonary next week for evaluation as it has been a while.  Pulse ox in clinic is 96% today.       Review of Systems   Constitutional: Negative for chills, diaphoresis, fatigue and fever.   HENT: Negative for congestion and sore throat.    Eyes: Negative for visual disturbance.   Respiratory: Negative for cough, chest tightness and shortness of breath.    Cardiovascular: Negative for chest pain, palpitations and leg swelling.   Gastrointestinal: Negative for abdominal pain, blood in stool, constipation, diarrhea, nausea and vomiting.   Genitourinary: Negative for dysuria, frequency, hematuria and urgency.   Musculoskeletal: Negative for arthralgias and back pain.   Skin: Negative for rash.   Neurological: Negative for dizziness, syncope, weakness and headaches.   Psychiatric/Behavioral: Negative for dysphoric mood and sleep disturbance. The patient is not nervous/anxious.        Objective:      Physical Exam   Constitutional: She appears well-developed and well-nourished. No distress.   HENT:   Head: Normocephalic and atraumatic.   Right Ear: Tympanic membrane and external ear normal.   Left Ear: Tympanic membrane and external ear normal.   Nose: Nose normal.   Mouth/Throat: Oropharynx is clear and moist.   Eyes: Conjunctivae are normal. Pupils are equal, round, and reactive to light.   Cardiovascular: Normal rate, regular rhythm and normal heart sounds.  Exam reveals no gallop.    No murmur heard.  Pulmonary/Chest: Effort normal and breath sounds normal. No respiratory distress.   Crackles in lung bases   Abdominal:  "Soft. Normal appearance. There is no tenderness. There is no rebound, no guarding and no CVA tenderness.   Musculoskeletal: She exhibits no edema.   Neurological: She is alert.   Skin: Skin is warm and dry. She is not diaphoretic.   Psychiatric: She has a normal mood and affect.   Nursing note and vitals reviewed.      Assessment:       1. Essential hypertension    2. H/O total hip arthroplasty, right, 01-    3. Pulmonary fibrosis, diagnosis 2010        Plan:       Josi was seen today for follow-up.    Diagnoses and all orders for this visit:    Essential hypertension-well controlled.  Somewhat low previously but now looks perfect at 122/80.  Cough has significantly improved since changing from Lisinopril to Irbesartan.  Continue Amlodipine.    H/O total hip arthroplasty, right, 01--doing great and has recovered well.    Pulmonary fibrosis, diagnosis 2010-seeing Dr. Mitchell next week, pulse ox 96% in clinic today.      Pt has been given instructions populated from [employer] and has verbalized understanding of the after visit summary and information contained wherein.    Follow up with a primary care provider. May go to ER for acute shortness of breath, lightheadedness, fever, or any other emergent complaints or changes in condition.    "This note will be shared with the patient"    Future Appointments  Date Time Provider Department Center   11/15/2017 11:30 AM PULMONARY FUNCTION [facility] [place] [facility]   11/15/2017 11:45 AM PULMONARY FUNCTION [facility] [place] [facility]   11/15/2017 12:00 PM PULMONARY FUNCTION [facility] [place] [facility]   11/15/2017 1:00 PM Adriana Mitchell MD [facility] [place] [facility]   11/15/2017 2:20 PM SIX, MINUTE WALK [facility] [place] [facility]               "

## 2017-11-15 ENCOUNTER — OFFICE VISIT (OUTPATIENT)
Dept: PULMONOLOGY | Facility: CLINIC | Age: 72
End: 2017-11-15
Payer: MEDICARE

## 2017-11-15 ENCOUNTER — HOSPITAL ENCOUNTER (OUTPATIENT)
Dept: PULMONOLOGY | Facility: CLINIC | Age: 72
Discharge: HOME | End: 2017-11-15
Payer: MEDICARE

## 2017-11-15 VITALS — WEIGHT: 182 LBS | HEIGHT: 64 IN | BODY MASS INDEX: 31.07 KG/M2

## 2017-11-15 VITALS
HEIGHT: 64 IN | SYSTOLIC BLOOD PRESSURE: 122 MMHG | BODY MASS INDEX: 31.07 KG/M2 | DIASTOLIC BLOOD PRESSURE: 80 MMHG | HEART RATE: 92 BPM | OXYGEN SATURATION: 92 % | WEIGHT: 182 LBS

## 2017-11-15 DIAGNOSIS — J84.10 PULMONARY FIBROSIS: ICD-10-CM

## 2017-11-15 DIAGNOSIS — J84.10 PULMONARY FIBROSIS: Primary | Chronic | ICD-10-CM

## 2017-11-15 LAB
PRE FEV1 FVC: 81
PRE FEV1: 1.25
PRE FVC: 1.55
PREDICTED FEV1 FVC: 78
PREDICTED FEV1: 2.16
PREDICTED FVC: 2.8

## 2017-11-15 PROCEDURE — 94620 PR PULMONARY STRESS TESTING,SIMPLE: CPT | Mod: S$GLB,,, | Performed by: INTERNAL MEDICINE

## 2017-11-15 PROCEDURE — 94729 DIFFUSING CAPACITY: CPT | Mod: S$GLB,,, | Performed by: INTERNAL MEDICINE

## 2017-11-15 PROCEDURE — 82803 BLOOD GASES ANY COMBINATION: CPT | Mod: S$GLB,,, | Performed by: INTERNAL MEDICINE

## 2017-11-15 PROCEDURE — 99499 UNLISTED E&M SERVICE: CPT | Mod: S$GLB,,, | Performed by: INTERNAL MEDICINE

## 2017-11-15 PROCEDURE — 94010 BREATHING CAPACITY TEST: CPT | Mod: S$GLB,,, | Performed by: INTERNAL MEDICINE

## 2017-11-15 PROCEDURE — 36600 WITHDRAWAL OF ARTERIAL BLOOD: CPT | Mod: S$GLB,,, | Performed by: INTERNAL MEDICINE

## 2017-11-15 PROCEDURE — 99999 PR PBB SHADOW E&M-EST. PATIENT-LVL III: CPT | Mod: PBBFAC,,, | Performed by: INTERNAL MEDICINE

## 2017-11-15 PROCEDURE — 99204 OFFICE O/P NEW MOD 45 MIN: CPT | Mod: 25,S$GLB,, | Performed by: INTERNAL MEDICINE

## 2017-11-15 RX ORDER — OMEPRAZOLE 20 MG/1
20 CAPSULE, DELAYED RELEASE ORAL EVERY MORNING
Qty: 90 CAPSULE | Refills: 3 | Status: SHIPPED | OUTPATIENT
Start: 2017-11-15 | End: 2019-01-09 | Stop reason: SDUPTHER

## 2017-11-15 NOTE — PROGRESS NOTES
"Subjective:       Patient ID: Josi Sidhu is a 72 y.o. female.    Chief Complaint: Interstitial Lung Disease    72 year old  With ILD last seen in 2011 then followed by Dr. Barakat.  Took Esbriet for 18 months, last taken March 2016.  Reports gradual decline function.  Tolerated hip replacement in January.  Overall, is in good health.  Complains mostly of depression.  Has a large hiatal hernia and takes one nexium daily.  No significant reflux symptoms.  No chronic cough.  Exercises 40 minutes a day.  Complaining of mild depression.        Review of Systems   Constitutional: Positive for weight gain. Negative for weight loss.   HENT: Negative for trouble swallowing.    Eyes: Negative for itching.   Respiratory: Negative for cough, sputum production, choking and shortness of breath.    Cardiovascular: Negative for chest pain and leg swelling.   Genitourinary: Negative for difficulty urinating.   Endocrine: Negative for cold intolerance and heat intolerance.    Musculoskeletal: Negative for arthralgias.   Skin: Negative for rash.   Gastrointestinal: Negative for acid reflux.   Neurological: Negative for headaches.   Hematological: Negative for adenopathy.   Psychiatric/Behavioral: Negative for confusion.       Past medical and surgical history reviewed.  Social and family history reviewed.  Allergies and medications reviewed.  Objective:       Vitals:    11/15/17 1307   BP: 122/80   BP Location: Left arm   Patient Position: Sitting   Pulse: 92   SpO2: (!) 92%   Weight: 82.6 kg (182 lb)   Height: 5' 4" (1.626 m)     Physical Exam   Constitutional: She is oriented to person, place, and time. She appears well-developed and well-nourished.   HENT:   Head: Normocephalic.   Nose: Nose normal.   Neck: Normal range of motion. Neck supple. No tracheal deviation present.   Cardiovascular: Normal rate, regular rhythm, normal heart sounds and intact distal pulses.    Pulmonary/Chest: Normal expansion, symmetric chest wall " expansion and effort normal. She has rales.   Abdominal: Soft. Bowel sounds are normal. There is no hepatosplenomegaly.   Musculoskeletal: Normal range of motion. She exhibits no edema.   Lymphadenopathy: No supraclavicular adenopathy is present.     She has no cervical adenopathy.   Neurological: She is alert and oriented to person, place, and time. No cranial nerve deficit.   Skin: Skin is warm and dry.   Psychiatric: She has a normal mood and affect.        Personal Diagnostic Review  Pulmonary function is relatively stable from 2011.  Desaturation to 87% on walk.    No flowsheet data found.      Assessment:       1. Pulmonary fibrosis, diagnosis 2010        Outpatient Encounter Prescriptions as of 11/15/2017   Medication Sig Dispense Refill    acetaminophen (TYLENOL) 500 MG tablet Take 2 tablets (1,000 mg total) by mouth every 8 (eight) hours as needed for Pain.  0    amlodipine (NORVASC) 5 MG tablet Take 1 tablet (5 mg total) by mouth every morning. 90 tablet 3    CMPD ketamine 10%- baclofen 2%- cyclobenzaprine 2%- cascade diclofenac 3%- gabapentin 10%- bupivacaine 2% in lipoderm cream Apply topically 1 to 2 grams to the affected area 3 to 4 times daily 100 g 5    irbesartan (AVAPRO) 300 MG tablet Take 1 tablet (300 mg total) by mouth every evening. 90 tablet 3    omeprazole (PRILOSEC) 20 MG capsule Take 1 capsule (20 mg total) by mouth every morning. 90 capsule 3    trazodone (DESYREL) 50 MG tablet Take 1 tablet (50 mg total) by mouth every evening. 90 tablet 3    [DISCONTINUED] omeprazole (PRILOSEC) 20 MG capsule Take 1 capsule (20 mg total) by mouth every morning. 90 capsule 3     Facility-Administered Encounter Medications as of 11/15/2017   Medication Dose Route Frequency Provider Last Rate Last Dose    albuterol sulfate nebulizer solution 2.5 mg  2.5 mg Nebulization 1 time in Clinic/HOD Hoa Orantes MD         Orders Placed This Encounter   Procedures    Spirometry without Bronchodilator      Standing Status:   Future     Standing Expiration Date:   11/15/2018    DLCO-Carbon Monoxide Diffusing Capacity     Standing Status:   Future     Standing Expiration Date:   11/15/2018    Stress test, pulmonary     Standing Status:   Future     Standing Expiration Date:   11/15/2018     Plan:       Stable disease.  Not ready for oxygen at this time.  Prevention is the goldman.    Reflux precautions regarding hiatal hernia.    Follow up in three months with eugenia, dlco and walk prior.

## 2017-11-15 NOTE — LETTER
November 15, 2017      Hoa Orantes MD  [address]  [place] [address]           [facility] - Pul[facility]  [address]  [facility] [address]  Phone: [phone]          Patient: Josi Sidhu   MR Number: [id number]   YOB: 1945   Date of Visit: 11/15/2017       Dear Dr. Hoa Orantes:    Thank you for referring Josi Sidhu to me for evaluation. Attached you will find relevant portions of my assessment and plan of care.    If you have questions, please do not hesitate to call me. I look forward to following Josi Sidhu along with you.    Sincerely,    Adriana Mitchell MD    Enclosure  CC:  No Recipients    If you would like to receive this communication electronically, please contact [email or web address] or [phone] to request more information on Pager Link access.    For providers and/or their staff who would like to refer a patient to [facility], please contact us through our one-stop-shop provider referral line, [facility], at [phone].    If you feel you have received this communication in error or would no longer like to receive these types of communications, please e-mail [email or web address]

## 2017-11-15 NOTE — PATIENT INSTRUCTIONS
What Is a Hiatal Hernia?    Hiatal hernia is when the area where the stomach and esophagus meet bulges up through the diaphragm into the chest cavity. In some cases, part of the stomach may bulge above the diaphragm. Stomach acid may move up into the esophagus and cause symptoms. The symptoms are often blamed on gastroesophageal reflux disease (GERD). You may only know about the hernia when it shows up on an X-ray taken for other reasons.   What you may feel  The hiatus is a normal hole in the diaphragm. The esophagus passes through this hole and leads to the stomach. In some cases, part of the stomach may bulge above the diaphragm. This bulge is called a hernia. Stomach acid may move up into the esophagus and cause symptoms.  When you eat, the muscle at the hiatus relaxes to allow food to pass into the stomach. It tightens again to keep food and digestive acids in the stomach.  Many people with hiatal hernias have mild symptoms. You may notice the following GERD symptoms:  · Heartburn or other chest discomfort  · A feeling of chest fullness after a meal  · Frequent burping  · Acid taste in the mouth  · Trouble swallowing  Treating symptoms  If you have been diagnosed with hiatal hernia, these suggestions may help improve symptoms:  · Lose excess weight. Extra weight puts pressure on the stomach and esophagus.  · Dont lie down after eating. Sit up for at least an hour after eating. Lying down after eating can increase symptoms.  · Avoid certain foods and drinks. These include fatty foods, chocolate, coffee, mint, and other foods that cause symptoms for you.  · Dont smoke or drink alcohol. These can worsen symptoms.  · Look at your medicines. Discuss your medicines with your healthcare provider. Many medicines can cause symptoms.  · Consider an antacid medicine. Ask your healthcare provider about over-the-counter and prescription medicines that may help.  · Ask about surgery, if needed. Surgery is a treatment  choice for some people. Your healthcare provider can determine if surgery is an option for you.    Date Last Reviewed: 10/1/2016  © 8628-7569 [employer]. [address] [place] [address], [place] [address]. All rights reserved. This information is not intended as a substitute for professional medical care. Always follow your healthcare professional's instructions.

## 2017-11-17 NOTE — PROCEDURES
Josi Sidhu is a 72 y.o.  female patient, who presents for a 6 minute walk test ordered by Adriana Mitchell MD.  The diagnosis is Qualify for Oxygen; Interstitial Lung Disease.  The patient's BMI is 31.3 kg/m2. Predicted distance (lower limit of normal) is 257.1 meters.    Test Results:    The test was completed without stopping.  The total time walked was 360 seconds.  During walking, the patient reported:  Dyspnea.  The patient used supplemental oxygen during repeat testing.     11/15/2017---------Distance: 365.76 meters (1200 feet)     O2 Sat % Supplemental Oxygen Heart Rate Blood Pressure Andrew Scale   Pre-exercise  (Resting) 96 % Room Air 103 bpm 137/65 mmHg 2   During Exercise 87 % Room Air 131 bpm 149/67 mmHg 4   Post-exercise   93 % Room Air  121 bpm       Recovery Time: 65 seconds    Oxygen Qualification:     O2 Sat % Supplemental Oxygen Heart Rate Blood Pressure Andrew Scale   Pre-exercise  (Resting) 97 % 2 L/M  99 bpm  106/75 mmHg 0.5    During Exercise 94 %  2 L/M  102 bpm  123/58 mmHg 0.5    Post-exercise   96 %  2 L/M  102 bpm        Performing nurse/tech:  NYDIA Duong    PREVIOUS STUDY:   The patient has not had a previous study.      CLINICAL INTERPRETATION:  Six minute walk distance is 365.76 meters (1200 feet) with somewhat heavy dyspnea.  During exercise, there was significant desaturation while breathing room air.  Blood pressure remained stable and Heart rate increased significantly with walking.  Tachycardia was present prior to exercise.  The patient did not report non-pulmonary symptoms during exercise.  The patient may benefit from using supplemental oxygen during exertion.  No previous study performed.  Based upon age and body mass index, exercise capacity is normal.   Oxygen saturation did improve while breathing supplemental oxygen.

## 2018-01-08 ENCOUNTER — PATIENT MESSAGE (OUTPATIENT)
Dept: PULMONOLOGY | Facility: CLINIC | Age: 73
End: 2018-01-08

## 2018-01-12 ENCOUNTER — HOSPITAL ENCOUNTER (OUTPATIENT)
Dept: PULMONOLOGY | Facility: CLINIC | Age: 73
Discharge: HOME | End: 2018-01-12
Payer: MEDICARE

## 2018-01-12 ENCOUNTER — OFFICE VISIT (OUTPATIENT)
Dept: PULMONOLOGY | Facility: CLINIC | Age: 73
End: 2018-01-12
Payer: MEDICARE

## 2018-01-12 VITALS
WEIGHT: 175 LBS | BODY MASS INDEX: 28.12 KG/M2 | SYSTOLIC BLOOD PRESSURE: 133 MMHG | HEART RATE: 94 BPM | HEIGHT: 66 IN | OXYGEN SATURATION: 97 % | DIASTOLIC BLOOD PRESSURE: 64 MMHG

## 2018-01-12 VITALS — WEIGHT: 175 LBS | BODY MASS INDEX: 28.12 KG/M2 | HEIGHT: 66 IN

## 2018-01-12 DIAGNOSIS — J84.10 PULMONARY FIBROSIS: Chronic | ICD-10-CM

## 2018-01-12 DIAGNOSIS — J84.10 PULMONARY FIBROSIS: Primary | Chronic | ICD-10-CM

## 2018-01-12 DIAGNOSIS — R09.02 OXYGEN DESATURATION: ICD-10-CM

## 2018-01-12 LAB
PRE FEV1 FVC: 83
PRE FEV1: 1.17
PRE FVC: 1.41
PREDICTED FEV1 FVC: 77
PREDICTED FEV1: 2.14
PREDICTED FVC: 2.78

## 2018-01-12 PROCEDURE — 94729 DIFFUSING CAPACITY: CPT | Mod: S$GLB,,, | Performed by: INTERNAL MEDICINE

## 2018-01-12 PROCEDURE — 99203 OFFICE O/P NEW LOW 30 MIN: CPT | Mod: 25,S$GLB,, | Performed by: NURSE PRACTITIONER

## 2018-01-12 PROCEDURE — 94618 PULMONARY STRESS TESTING: CPT | Mod: S$GLB,,, | Performed by: INTERNAL MEDICINE

## 2018-01-12 PROCEDURE — 99999 PR PBB SHADOW E&M-EST. PATIENT-LVL III: CPT | Mod: PBBFAC,,, | Performed by: NURSE PRACTITIONER

## 2018-01-12 PROCEDURE — 99499 UNLISTED E&M SERVICE: CPT | Mod: S$GLB,,, | Performed by: NURSE PRACTITIONER

## 2018-01-12 PROCEDURE — 94010 BREATHING CAPACITY TEST: CPT | Mod: S$GLB,,, | Performed by: INTERNAL MEDICINE

## 2018-01-12 NOTE — PROGRESS NOTES
Subjective:       Patient ID: Josi Sidhu is a 73 y.o. female.    Chief Complaint: Oxygen re qualification    HPI  Josi Sidhu is a 73 y.o. female who presents today for oxygen re qualification. She has a history of ILD (dx in ) took esbriet for 18 months last dose in 2016. Has hiatal hernia and takes PPI.  She has been more short of breath and feels she may need supplemental oxygen. She states she has not been as active lately and suffers from some depression with regards to her lung disease. She is not interested in going to a facility for pulmonary rehab but is interested in exercising at home. She has no chronic cough, fever, chills, chest pain, or LE edema.    Review of patient's allergies indicates:   Allergen Reactions    Asa [aspirin]      Due to Dx anemia.    Nsaids (non-steroidal anti-inflammatory drug)      Due to DX of anemia.     Past Medical History:   Diagnosis Date    History of tobacco abuse     remote, quit , smoked PPD 13 days    Hyperlipidemia     Hypertension     since age 39    Pulmonary fibrosis         Recent childbirth      age age [age] normal, c-sec, cord     Past Surgical History:   Procedure Laterality Date    CATARACT EXTRACTION      both done but five years apart     SECTION      x2    ELBOW SURGERY Right     fractured     Family History     Problem Relation (Age of Onset)    Heart disease Mother (69)    Hypertension Mother        Social History Main Topics    Smoking status: Former Smoker     Packs/day: 1.00     Years: 15.00     Types: Cigarettes     Quit date: 10/26/1983    Smokeless tobacco: Never Used    Alcohol use 7.0 oz/week     14 Standard drinks or equivalent per week      Comment: Nightly drinker- two large drinks    Drug use: No    Sexual activity: Yes     Partners: Male       Review of Systems   Constitutional: Negative for fever and chills.   HENT: Negative for trouble swallowing.    Respiratory:  "Negative for cough.    Cardiovascular: Negative for chest pain.   Skin: Negative for rash.   Gastrointestinal: Negative for acid reflux.       Objective:       Vitals:    01/12/18 1404   BP: 133/64   Pulse: 94   SpO2: 97%   Weight: 79.4 kg (175 lb)   Height: 5' 6" (1.676 m)     Physical Exam   Constitutional: She is oriented to person, place, and time. She appears well-nourished. No distress.   HENT:   Head: Normocephalic.   Cardiovascular: Normal rate and intact distal pulses.    Pulmonary/Chest: Normal expansion, symmetric chest wall expansion and effort normal. No respiratory distress. She has no wheezes. She has rales.   Musculoskeletal: She exhibits no edema.   Neurological: She is alert and oriented to person, place, and time.   Skin: Skin is warm and dry.   Psychiatric:   Somewhat depressed   Vitals reviewed.    Personal Diagnostic Review    PFT's reviewed 1/12/18: moderate restriction which is stable when compared to previous studies.   6mwt reviewed 1/12/18: desaturated to 84% during activity ambulated 1000ft improved to 92% on 2L continuous nc  Assessment:         Outpatient Encounter Prescriptions as of 1/12/2018   Medication Sig Dispense Refill    acetaminophen (TYLENOL) 500 MG tablet Take 2 tablets (1,000 mg total) by mouth every 8 (eight) hours as needed for Pain.  0    amlodipine (NORVASC) 5 MG tablet Take 1 tablet (5 mg total) by mouth every morning. 90 tablet 3    CMPD ketamine 10%- baclofen 2%- cyclobenzaprine 2%- cascade diclofenac 3%- gabapentin 10%- bupivacaine 2% in lipoderm cream Apply topically 1 to 2 grams to the affected area 3 to 4 times daily 100 g 5    irbesartan (AVAPRO) 300 MG tablet Take 1 tablet (300 mg total) by mouth every evening. 90 tablet 3    omeprazole (PRILOSEC) 20 MG capsule Take 1 capsule (20 mg total) by mouth every morning. 90 capsule 3    trazodone (DESYREL) 50 MG tablet Take 1 tablet (50 mg total) by mouth every evening. 90 tablet 3     Facility-Administered " Encounter Medications as of 1/12/2018   Medication Dose Route Frequency Provider Last Rate Last Dose    albuterol sulfate nebulizer solution 2.5 mg  2.5 mg Nebulization 1 time in Clinic/HOD Hoa Orantes MD         Problem List Items Addressed This Visit        Pulmonary    Pulmonary fibrosis, diagnosis 2010 - Primary (Chronic)      Other Visit Diagnoses     Oxygen desaturation with activity        Relevant Orders    OXYGEN FOR HOME USE        Plan:       · Due to desaturation during 6mwt, recommend supplemental oxygen at 2L and patient would benefit from small purse sized portable concentrator.  · Encouraged patient to start exercise program at home if she is not willing to attend pulmonary rehab.  Follow up in 3 months  Contact us if any issues or concerns should arise.   Patient verbalized understanding of all information, instruction, education, recommendations provided and agrees with plan of care.

## 2018-01-13 NOTE — PROCEDURES
Josi Sidhu is a 73 y.o.  female patient, who presents for a 6 minute walk test ordered by Adriana Mitchell MD.  The diagnosis is Qualify for Oxygen, Shortness of Breath; Interstitial Lung Disease.  The patient's BMI is 28.3 kg/m2. Predicted distance (lower limit of normal) is 269.99 meters.    Test Results:    The test was completed without stopping.  The total time walked was 360 seconds.  During walking, the patient reported:  Dyspnea.  The patient used supplemental oxygen during repeat testing.     01/12/2018---------Distance: 304.8 meters (1000 feet)     O2 Sat % Supplemental Oxygen Heart Rate Blood Pressure Andrew Scale   Pre-exercise  (Resting) 95 % Room Air 105 bpm 133/62 mmHg 3   During Exercise 84 % Room Air 109 bpm 145/66 mmHg 5-6   Post-exercise   93 % Room Air  111 bpm       Recovery Time: 95 seconds    Oxygen Qualification:     O2 Sat % Supplemental Oxygen Heart Rate Blood Pressure Andrew Scale   Pre-exercise  (Resting) 97 % 2 L/M  94 bpm  133/64 mmHg 2    During Exercise 92 %  2 L/M  117 bpm  133/62 mmHg 3    Post-exercise   97 %  2 L/M  99 bpm        Performing nurse/tech:  Scott STAFFORD      PREVIOUS STUDY:   11/15/2017---------Distance: 365.76 meters (1200 feet)       O2 Sat % Supplemental Oxygen Heart Rate Blood Pressure Andrew Scale   Pre-exercise  (Resting) 96 % Room Air 103 bpm 137/65 mmHg 2   During Exercise 87 % Room Air 131 bpm 149/67 mmHg 4   Post-exercise 93 % Room Air  121 bpm           CLINICAL INTERPRETATION:  Six minute walk distance is 304.8 meters (1000 feet) with heavy dyspnea.  During exercise, there was significant desaturation while breathing room air.  Both blood pressure and heart rate remained stable with walking.  Tachycardia was present prior to exercise.  The patient did not report non-pulmonary symptoms during exercise.  The patient may benefit from using supplemental oxygen during exertion.  Since the previous study in November 2017, exercise capacity is  significantly worse.  Based upon age and body mass index, exercise capacity is normal.   Oxygen saturation did improve while breathing supplemental oxygen.

## 2018-01-31 RX ORDER — TRAZODONE HYDROCHLORIDE 50 MG/1
TABLET ORAL
Qty: 90 TABLET | Refills: 3 | Status: SHIPPED | OUTPATIENT
Start: 2018-01-31 | End: 2019-01-09 | Stop reason: SDUPTHER

## 2018-05-22 ENCOUNTER — PES CALL (OUTPATIENT)
Dept: ADMINISTRATIVE | Facility: CLINIC | Age: 73
End: 2018-05-22

## 2018-07-02 ENCOUNTER — PES CALL (OUTPATIENT)
Dept: ADMINISTRATIVE | Facility: CLINIC | Age: 73
End: 2018-07-02

## 2018-09-19 RX ORDER — IRBESARTAN 300 MG/1
300 TABLET ORAL NIGHTLY
Qty: 90 TABLET | Refills: 3 | Status: SHIPPED | OUTPATIENT
Start: 2018-09-19 | End: 2019-01-09 | Stop reason: SDUPTHER

## 2018-09-25 ENCOUNTER — PES CALL (OUTPATIENT)
Dept: ADMINISTRATIVE | Facility: CLINIC | Age: 73
End: 2018-09-25

## 2019-01-08 ENCOUNTER — TELEPHONE (OUTPATIENT)
Dept: INTERNAL MEDICINE | Facility: CLINIC | Age: 74
End: 2019-01-08

## 2019-01-08 DIAGNOSIS — E78.2 MIXED HYPERLIPIDEMIA: Chronic | ICD-10-CM

## 2019-01-08 DIAGNOSIS — I10 ESSENTIAL HYPERTENSION: Primary | ICD-10-CM

## 2019-01-08 DIAGNOSIS — J84.10 PULMONARY FIBROSIS: Chronic | ICD-10-CM

## 2019-01-08 DIAGNOSIS — Z96.641 H/O TOTAL HIP ARTHROPLASTY, RIGHT: ICD-10-CM

## 2019-01-08 DIAGNOSIS — K21.9 GASTROESOPHAGEAL REFLUX DISEASE, ESOPHAGITIS PRESENCE NOT SPECIFIED: ICD-10-CM

## 2019-01-09 ENCOUNTER — OFFICE VISIT (OUTPATIENT)
Dept: INTERNAL MEDICINE | Facility: CLINIC | Age: 74
End: 2019-01-09
Payer: MEDICARE

## 2019-01-09 ENCOUNTER — LAB VISIT (OUTPATIENT)
Dept: LAB | Facility: HOSPITAL | Age: 74
End: 2019-01-09
Attending: INTERNAL MEDICINE
Payer: MEDICARE

## 2019-01-09 VITALS
SYSTOLIC BLOOD PRESSURE: 132 MMHG | HEIGHT: 66 IN | OXYGEN SATURATION: 91 % | WEIGHT: 188.69 LBS | BODY MASS INDEX: 30.33 KG/M2 | HEART RATE: 56 BPM | DIASTOLIC BLOOD PRESSURE: 58 MMHG

## 2019-01-09 DIAGNOSIS — L21.9 SEBORRHEIC DERMATITIS OF SCALP: ICD-10-CM

## 2019-01-09 DIAGNOSIS — Z78.0 POSTMENOPAUSAL STATUS: ICD-10-CM

## 2019-01-09 DIAGNOSIS — I10 ESSENTIAL HYPERTENSION: ICD-10-CM

## 2019-01-09 DIAGNOSIS — J84.10 PULMONARY FIBROSIS: Chronic | ICD-10-CM

## 2019-01-09 DIAGNOSIS — K21.9 GASTROESOPHAGEAL REFLUX DISEASE, ESOPHAGITIS PRESENCE NOT SPECIFIED: ICD-10-CM

## 2019-01-09 DIAGNOSIS — F41.9 ANXIETY: ICD-10-CM

## 2019-01-09 DIAGNOSIS — Z00.00 ANNUAL PHYSICAL EXAM: Primary | ICD-10-CM

## 2019-01-09 DIAGNOSIS — E78.2 MIXED HYPERLIPIDEMIA: Chronic | ICD-10-CM

## 2019-01-09 DIAGNOSIS — G47.00 INSOMNIA, UNSPECIFIED TYPE: ICD-10-CM

## 2019-01-09 DIAGNOSIS — Z96.641 H/O TOTAL HIP ARTHROPLASTY, RIGHT: ICD-10-CM

## 2019-01-09 DIAGNOSIS — Z99.81 SUPPLEMENTAL OXYGEN DEPENDENT: ICD-10-CM

## 2019-01-09 DIAGNOSIS — I10 HYPERTENSION, BENIGN: ICD-10-CM

## 2019-01-09 LAB
ALBUMIN SERPL BCP-MCNC: 3.7 G/DL
ALP SERPL-CCNC: 72 U/L
ALT SERPL W/O P-5'-P-CCNC: 15 U/L
ANION GAP SERPL CALC-SCNC: 8 MMOL/L
AST SERPL-CCNC: 17 U/L
BASOPHILS # BLD AUTO: 0.03 K/UL
BASOPHILS NFR BLD: 0.3 %
BILIRUB SERPL-MCNC: 0.6 MG/DL
BUN SERPL-MCNC: 15 MG/DL
CALCIUM SERPL-MCNC: 9.5 MG/DL
CHLORIDE SERPL-SCNC: 104 MMOL/L
CHOLEST SERPL-MCNC: 206 MG/DL
CHOLEST/HDLC SERPL: 3.3 {RATIO}
CO2 SERPL-SCNC: 26 MMOL/L
CREAT SERPL-MCNC: 0.9 MG/DL
DIFFERENTIAL METHOD: ABNORMAL
EOSINOPHIL # BLD AUTO: 0.4 K/UL
EOSINOPHIL NFR BLD: 4 %
ERYTHROCYTE [DISTWIDTH] IN BLOOD BY AUTOMATED COUNT: 17.4 %
EST. GFR  (AFRICAN AMERICAN): >60 ML/MIN/1.73 M^2
EST. GFR  (NON AFRICAN AMERICAN): >60 ML/MIN/1.73 M^2
GLUCOSE SERPL-MCNC: 94 MG/DL
HCT VFR BLD AUTO: 43.7 %
HDLC SERPL-MCNC: 63 MG/DL
HDLC SERPL: 30.6 %
HGB BLD-MCNC: 13.9 G/DL
LDLC SERPL CALC-MCNC: 119.2 MG/DL
LYMPHOCYTES # BLD AUTO: 1.8 K/UL
LYMPHOCYTES NFR BLD: 16.7 %
MCH RBC QN AUTO: 26.5 PG
MCHC RBC AUTO-ENTMCNC: 31.8 G/DL
MCV RBC AUTO: 83 FL
MONOCYTES # BLD AUTO: 0.9 K/UL
MONOCYTES NFR BLD: 8.7 %
NEUTROPHILS # BLD AUTO: 7.4 K/UL
NEUTROPHILS NFR BLD: 69.7 %
NONHDLC SERPL-MCNC: 143 MG/DL
PLATELET # BLD AUTO: 292 K/UL
PMV BLD AUTO: 10.9 FL
POTASSIUM SERPL-SCNC: 4.6 MMOL/L
PROT SERPL-MCNC: 8 G/DL
RBC # BLD AUTO: 5.24 M/UL
SODIUM SERPL-SCNC: 138 MMOL/L
T4 FREE SERPL-MCNC: 1.24 NG/DL
TRIGL SERPL-MCNC: 119 MG/DL
TSH SERPL DL<=0.005 MIU/L-ACNC: 4.23 UIU/ML
WBC # BLD AUTO: 10.59 K/UL

## 2019-01-09 PROCEDURE — 80061 LIPID PANEL: CPT | Mod: HCNC

## 2019-01-09 PROCEDURE — 3078F DIAST BP <80 MM HG: CPT | Mod: CPTII,HCNC,S$GLB, | Performed by: INTERNAL MEDICINE

## 2019-01-09 PROCEDURE — 3075F SYST BP GE 130 - 139MM HG: CPT | Mod: CPTII,HCNC,S$GLB, | Performed by: INTERNAL MEDICINE

## 2019-01-09 PROCEDURE — 99999 PR PBB SHADOW E&M-EST. PATIENT-LVL IV: CPT | Mod: PBBFAC,HCNC,, | Performed by: INTERNAL MEDICINE

## 2019-01-09 PROCEDURE — 36415 COLL VENOUS BLD VENIPUNCTURE: CPT | Mod: HCNC

## 2019-01-09 PROCEDURE — 99397 PR PREVENTIVE VISIT,EST,65 & OVER: ICD-10-PCS | Mod: HCNC,S$GLB,, | Performed by: INTERNAL MEDICINE

## 2019-01-09 PROCEDURE — 80053 COMPREHEN METABOLIC PANEL: CPT | Mod: HCNC

## 2019-01-09 PROCEDURE — 84439 ASSAY OF FREE THYROXINE: CPT | Mod: HCNC

## 2019-01-09 PROCEDURE — 3075F PR MOST RECENT SYSTOLIC BLOOD PRESS GE 130-139MM HG: ICD-10-PCS | Mod: CPTII,HCNC,S$GLB, | Performed by: INTERNAL MEDICINE

## 2019-01-09 PROCEDURE — 99397 PER PM REEVAL EST PAT 65+ YR: CPT | Mod: HCNC,S$GLB,, | Performed by: INTERNAL MEDICINE

## 2019-01-09 PROCEDURE — 84443 ASSAY THYROID STIM HORMONE: CPT | Mod: HCNC

## 2019-01-09 PROCEDURE — 99999 PR PBB SHADOW E&M-EST. PATIENT-LVL IV: ICD-10-PCS | Mod: PBBFAC,HCNC,, | Performed by: INTERNAL MEDICINE

## 2019-01-09 PROCEDURE — 3078F PR MOST RECENT DIASTOLIC BLOOD PRESSURE < 80 MM HG: ICD-10-PCS | Mod: CPTII,HCNC,S$GLB, | Performed by: INTERNAL MEDICINE

## 2019-01-09 PROCEDURE — 85025 COMPLETE CBC W/AUTO DIFF WBC: CPT | Mod: HCNC

## 2019-01-09 RX ORDER — TRAZODONE HYDROCHLORIDE 50 MG/1
50 TABLET ORAL NIGHTLY
Qty: 90 TABLET | Refills: 3 | Status: SHIPPED | OUTPATIENT
Start: 2019-01-09

## 2019-01-09 RX ORDER — IRBESARTAN 300 MG/1
300 TABLET ORAL NIGHTLY
Qty: 90 TABLET | Refills: 3 | Status: SHIPPED | OUTPATIENT
Start: 2019-01-09 | End: 2020-01-09

## 2019-01-09 RX ORDER — OMEPRAZOLE 20 MG/1
20 CAPSULE, DELAYED RELEASE ORAL EVERY MORNING
Qty: 90 CAPSULE | Refills: 3 | Status: SHIPPED | OUTPATIENT
Start: 2019-01-09

## 2019-01-09 RX ORDER — ESCITALOPRAM OXALATE 5 MG/1
5 TABLET ORAL NIGHTLY
Qty: 90 TABLET | Refills: 3 | Status: SHIPPED | OUTPATIENT
Start: 2019-01-09 | End: 2019-01-22 | Stop reason: SDUPTHER

## 2019-01-09 RX ORDER — HYDROCORTISONE BUTYRATE 1 MG/G
1 CREAM TOPICAL 2 TIMES DAILY
Qty: 45 G | Refills: 2 | Status: SHIPPED | OUTPATIENT
Start: 2019-01-09

## 2019-01-09 RX ORDER — AMLODIPINE BESYLATE 5 MG/1
5 TABLET ORAL EVERY MORNING
Qty: 90 TABLET | Refills: 3 | Status: SHIPPED | OUTPATIENT
Start: 2019-01-09

## 2019-01-09 NOTE — PATIENT INSTRUCTIONS
Results for orders placed or performed in visit on 01/09/19   CBC auto differential   Result Value Ref Range    WBC 10.59 3.90 - 12.70 K/uL    RBC 5.24 4.00 - 5.40 M/uL    Hemoglobin 13.9 12.0 - 16.0 g/dL    Hematocrit 43.7 37.0 - 48.5 %    MCV 83 82 - 98 fL    MCH 26.5 (L) 27.0 - 31.0 pg    MCHC 31.8 (L) 32.0 - 36.0 g/dL    RDW 17.4 (H) 11.5 - 14.5 %    Platelets 292 150 - 350 K/uL    MPV 10.9 9.2 - 12.9 fL    Gran # (ANC) 7.4 1.8 - 7.7 K/uL    Lymph # 1.8 1.0 - 4.8 K/uL    Mono # 0.9 0.3 - 1.0 K/uL    Eos # 0.4 0.0 - 0.5 K/uL    Baso # 0.03 0.00 - 0.20 K/uL    Gran% 69.7 38.0 - 73.0 %    Lymph% 16.7 (L) 18.0 - 48.0 %    Mono% 8.7 4.0 - 15.0 %    Eosinophil% 4.0 0.0 - 8.0 %    Basophil% 0.3 0.0 - 1.9 %    Differential Method Automated    Comprehensive metabolic panel   Result Value Ref Range    Sodium 138 136 - 145 mmol/L    Potassium 4.6 3.5 - 5.1 mmol/L    Chloride 104 95 - 110 mmol/L    CO2 26 23 - 29 mmol/L    Glucose 94 70 - 110 mg/dL    BUN, Bld 15 8 - 23 mg/dL    Creatinine 0.9 0.5 - 1.4 mg/dL    Calcium 9.5 8.7 - 10.5 mg/dL    Total Protein 8.0 6.0 - 8.4 g/dL    Albumin 3.7 3.5 - 5.2 g/dL    Total Bilirubin 0.6 0.1 - 1.0 mg/dL    Alkaline Phosphatase 72 55 - 135 U/L    AST 17 10 - 40 U/L    ALT 15 10 - 44 U/L    Anion Gap 8 8 - 16 mmol/L    eGFR if African American >60 >60 mL/min/1.73 m^2    eGFR if non African American >60 >60 mL/min/1.73 m^2

## 2019-01-10 ENCOUNTER — TELEPHONE (OUTPATIENT)
Dept: INTERNAL MEDICINE | Facility: CLINIC | Age: 74
End: 2019-01-10

## 2019-01-10 ENCOUNTER — OUTPATIENT CASE MANAGEMENT (OUTPATIENT)
Dept: ADMINISTRATIVE | Facility: OTHER | Age: 74
End: 2019-01-10

## 2019-01-10 NOTE — TELEPHONE ENCOUNTER
----- Message from Eladia Almazan sent at 1/10/2019  4:18 PM CST -----  Thank you for the referral.  Patient has been assigned to Cayla Leone LMSW for low risk screening for Outpatient Case Management.      Reason for referral: Pulmonary fibrosis  H/O total hip arthroplasty, right  Hypertension, benign  Anxiety  Insomnia, unspecified type  Essential hypertension  Gastroesophageal reflux disease, esophagitis presence not specified  Mixed hyperlipidemia  Postmenopausal status  Seborrheic dermatitis of scalp  Supplemental oxygen dependent     Please contact OPCM at ext. [phone] with any questions.     Thank you,     Eladia Almazan, [facility]  Outpatient Care Mgmt.  [phone]

## 2019-01-10 NOTE — PROGRESS NOTES
Thank you for the referral.  Patient has been assigned to Cayla Leone LMSW for low risk screening for Outpatient Case Management.     Reason for referral: Pulmonary fibrosis  H/O total hip arthroplasty, right  Hypertension, benign  Anxiety  Insomnia, unspecified type  Essential hypertension  Gastroesophageal reflux disease, esophagitis presence not specified  Mixed hyperlipidemia  Postmenopausal status  Seborrheic dermatitis of scalp  Supplemental oxygen dependent    Please contact OPCM at ext. [phone] with any questions.    Thank you,    Eladia Almazan, SSC  Outpatient Care Mgmt.  [phone]

## 2019-01-11 ENCOUNTER — OUTPATIENT CASE MANAGEMENT (OUTPATIENT)
Dept: ADMINISTRATIVE | Facility: OTHER | Age: 74
End: 2019-01-11

## 2019-01-11 ENCOUNTER — TELEPHONE (OUTPATIENT)
Dept: PULMONOLOGY | Facility: CLINIC | Age: 74
End: 2019-01-11

## 2019-01-11 NOTE — PROGRESS NOTES
This LMSW received a referral on the above patient.   Reason for referral: low risk, Pulmonary fibrosis, H/O total hip arthroplasty, right, Hypertension, benign, Anxiety, Insomnia, unspecified type, Essential hypertension, Gastroesophageal reflux disease, esophagitis presence not specified, Mixed hyperlipidemia, Postmenopausal status, Seborrheic dermatitis of scalp, Supplemental oxygen dependent  Name of the community resource that was provided: Private Hire Caregivers, Advance Directive Brochure, Senior Resource Guide  Resource given to: Patient via [place] mail    LMSW completed social work assessment with patient. Patient reports living alone and reports needing assistance with ADLs. Patient uses walker to ambulate and uses home oxygen concentrator. Patient reports nearest/closet relative is her brother who is in [place]. Patient reports she does not have any other family in the local area. Patient reports needing someone to assist her with her personal care including assisting her with bathing. Patient reports she is interested to know if this form of assistance is covered under her medical insurance. LMSW informed patient that medical insurances traditionally do not cover non-medical services. Patient advised that her PCP can place orders for home health services he she believes patient will benefit from skilled nursing, PT/OT, or ST. Patient reports that she does not require home health services at present and will hold off on requesting such services until she needs it. Patient reports feeling down at times however she declines counseling resources. LMSW discussed with patient options for her to obtain more in-home support or private caregiver. LMSW informed patient that [facility] can provide her with list of private hire caregivers and home care agencies within the community for her to identify a provider. Patient verbalized understanding and agreement with this plan. Patient informed resources discussed will be  mailed to her home address on file. No other needs or concerns identified. Case closed. Referral source notified.

## 2019-01-11 NOTE — TELEPHONE ENCOUNTER
I spoke to Ms Sidhu to let her know Dr Mitchell should have more available dates soon. When schedule is available I will schedule her follow up. Pt stated late morning because her brother brings her and drives in from [place]. Pt verbalized understanding.

## 2019-01-11 NOTE — TELEPHONE ENCOUNTER
----- Message from Peri Landaverde sent at 1/11/2019  2:27 PM CST -----  Contact: Mrs. Thea Blunt,     The current patient had a referral placed by Dr. Orantes  for follow up care with Dr. Mitchell. The patient stated  the appointment isn't urgent, but she is curious as to when she might be scheduled. Could you please reach out to the patient and let her know when she can expect to be added to the schedule?    Thank you Merlene!    Peri Landaverde   [facility]  [phone]

## 2019-01-12 NOTE — PROGRESS NOTES
Subjective:       Patient ID: Josi Sidhu is a 74 y.o. female.    Chief Complaint: Follow-up (last office visit 10-)   She presents to the office today with her brother, Riley.  He is her general power of  in general medical power of .  Importance of getting advanced directives on file emphasized.  Not interested in the digital medicine hypertension program.    She is declining.  This dictation was performed using using MModal.    She has moved into [facility] on [address].  She does not drive.  She is on a walker.  She is capable of using rubor.  [facility] discussed, assisted living discussed, in home care and .    She is sleeping with oxygen.  She does not wear oxygen to this visit.  HPI  Review of Systems   Constitutional: Positive for fatigue. Negative for activity change, appetite change, chills, fever and unexpected weight change.   HENT: Negative for dental problem, hearing loss, tinnitus, trouble swallowing and voice change.    Eyes: Negative for visual disturbance.   Respiratory: Positive for shortness of breath. Negative for cough, chest tightness and wheezing.    Cardiovascular: Negative for chest pain, palpitations and leg swelling.   Gastrointestinal: Negative for abdominal pain, blood in stool, constipation, diarrhea and nausea.   Genitourinary: Negative for difficulty urinating, dysuria, frequency and urgency.   Musculoskeletal: Negative for arthralgias, back pain, gait problem, joint swelling, myalgias, neck pain and neck stiffness.   Skin: Negative for rash.   Neurological: Negative for dizziness, tremors, speech difficulty, weakness, light-headedness and headaches.   Psychiatric/Behavioral: Positive for sleep disturbance. Negative for dysphoric mood. The patient is nervous/anxious.        Objective:      Physical Exam   Constitutional: She is oriented to person, place, and time. She appears well-developed and well-nourished. No  distress.   HENT:   Head: Normocephalic and atraumatic.   Right Ear: External ear normal.   Left Ear: External ear normal.   Nose: Nose normal.   Mouth/Throat: Oropharynx is clear and moist. No oropharyngeal exudate.   Eyes: Conjunctivae and EOM are normal. Pupils are equal, round, and reactive to light. Right eye exhibits no discharge. No scleral icterus.   Neck: Normal range of motion and full passive range of motion without pain. Neck supple. No spinous process tenderness and no muscular tenderness present. Carotid bruit is not present. No thyromegaly present.   Cardiovascular: Normal rate, regular rhythm, S1 normal, S2 normal, normal heart sounds and intact distal pulses. Exam reveals no gallop and no friction rub.   No murmur heard.  Pulmonary/Chest: Effort normal and breath sounds normal. No respiratory distress. She has no wheezes. She has no rales. She exhibits no tenderness.   Abdominal: Soft. Bowel sounds are normal. She exhibits no distension and no mass. There is no tenderness. There is no rebound and no guarding.   Genitourinary: Pelvic exam was performed with patient supine. Uterus is not deviated, not enlarged, not fixed and not tender. Cervix exhibits no motion tenderness, no discharge and no friability. Right adnexum displays no mass, no tenderness and no fullness. Left adnexum displays no mass, no tenderness and no fullness.   Musculoskeletal: Normal range of motion. She exhibits no edema or tenderness.   Lymphadenopathy:        Head (right side): No submental and no submandibular adenopathy present.        Head (left side): No submental and no submandibular adenopathy present.     She has no cervical adenopathy.        Right cervical: No superficial cervical, no deep cervical and no posterior cervical adenopathy present.       Left cervical: No superficial cervical, no deep cervical and no posterior cervical adenopathy present.        Right axillary: No pectoral and no lateral adenopathy present.         Left axillary: No pectoral and no lateral adenopathy present.       Right: No supraclavicular adenopathy present.        Left: No supraclavicular adenopathy present.   Neurological: She is alert and oriented to person, place, and time. She has normal reflexes. No cranial nerve deficit. She exhibits normal muscle tone. Coordination normal.   Skin: Skin is warm and dry. No rash noted.   Psychiatric: She has a normal mood and affect. Her behavior is normal. Her mood appears not anxious. Her speech is not rapid and/or pressured. She is not agitated. She does not exhibit a depressed mood.   Normal behavior, thought content, insight and judgement.   Nursing note and vitals reviewed.      Assessment:       1. Annual physical exam    2. Pulmonary fibrosis, diagnosis 2010    3. H/O total hip arthroplasty, right, 01-    4. Hypertension, benign    5. Anxiety    6. Insomnia, unspecified type    7. Essential hypertension    8. Gastroesophageal reflux disease, esophagitis presence not specified    9. Mixed hyperlipidemia    10. Postmenopausal status    11. Seborrheic dermatitis of scalp    12. Supplemental oxygen dependent        Plan:   Josi was seen today for follow-up.    Diagnoses and all orders for this visit:    Annual physical exam    Pulmonary fibrosis, diagnosis 2010.  Strongly encouraged to establish with a pulmonary medicine physician to follow her on a regular basis.  Look into the better breathers program located [facility].  Case management may be able to give her some advice on long-term planning.  [employer].  Advanced directives  -     Ambulatory Referral to Pulmonology  -     Ambulatory referral to Outpatient Case Management    H/O total hip arthroplasty, right, 01-.  Encouraged to do exercises on a daily basis.  Sit and be fit.  -     Ambulatory referral to Outpatient Case Management    Hypertension, benign, monitor.  -     amLODIPine (NORVASC) 5 MG tablet; Take 1 tablet  (5 mg total) by mouth every morning.  -     Ambulatory referral to Outpatient Case Management    Anxiety  -     Ambulatory referral to Outpatient Case Management    Insomnia, unspecified type  -     Ambulatory referral to Outpatient Case Management    Essential hypertension  -     Ambulatory referral to Outpatient Case Management    Gastroesophageal reflux disease, esophagitis presence not specified  -     Ambulatory referral to Outpatient Case Management    Mixed hyperlipidemia  -     Ambulatory referral to Outpatient Case Management    Postmenopausal status  -     Ambulatory referral to Outpatient Case Management    Seborrheic dermatitis of scalp  -     Ambulatory referral to Outpatient Case Management    Supplemental oxygen dependent  -     Ambulatory referral to Outpatient Case Management    Other orders  -     omeprazole (PRILOSEC) 20 MG capsule; Take 1 capsule (20 mg total) by mouth every morning.  -     irbesartan (AVAPRO) 300 MG tablet; Take 1 tablet (300 mg total) by mouth every evening.  -     traZODone (DESYREL) 50 MG tablet; Take 1 tablet (50 mg total) by mouth every evening.  -     escitalopram oxalate (LEXAPRO) 5 MG Tab; Take 1 tablet (5 mg total) by mouth nightly. Relieve anxiety and improve sleep  -     hydrocortisone butyrate 0.1 % Crea cream; Apply 1 application topically 2 (two) times daily.  -     sodium chloride (OCEAN) 0.65 % nasal spray; 2 sprays by Nasal route as needed for Congestion (nasal oxygen therapy). Each bottle 30 ml       Medication List           Accurate as of 1/9/19 11:59 PM. If you have any questions, ask your nurse or doctor.               START taking these medications    escitalopram oxalate 5 MG Tab  Commonly known as:  LEXAPRO  Take 1 tablet (5 mg total) by mouth nightly. Relieve anxiety and improve sleep  Started by:  Hoa Sherman MD     hydrocortisone butyrate 0.1 % Crea cream  Apply 1 application topically 2 (two) times daily.  Started by:  Hoa Sherman MD      sodium chloride 0.65 % nasal spray  Commonly known as:  OCEAN  2 sprays by Nasal route as needed for Congestion (nasal oxygen therapy). Each bottle 30 ml  Started by:  Hoa Sherman MD        CONTINUE taking these medications    acetaminophen 500 MG tablet  Commonly known as:  TYLENOL  Take 2 tablets (1,000 mg total) by mouth every 8 (eight) hours as needed for Pain.     amLODIPine 5 MG tablet  Commonly known as:  NORVASC  Take 1 tablet (5 mg total) by mouth every morning.     irbesartan 300 MG tablet  Commonly known as:  AVAPRO  Take 1 tablet (300 mg total) by mouth every evening.     omeprazole 20 MG capsule  Commonly known as:  PRILOSEC  Take 1 capsule (20 mg total) by mouth every morning.     traZODone 50 MG tablet  Commonly known as:  DESYREL  Take 1 tablet (50 mg total) by mouth every evening.           Where to Get Your Medications      These medications were sent to [facility] - [place], [place] - [address]  [address], [facility] [address]    Phone:  [phone]   · amLODIPine 5 MG tablet  · escitalopram oxalate 5 MG Tab  · hydrocortisone butyrate 0.1 % Crea cream  · irbesartan 300 MG tablet  · omeprazole 20 MG capsule  · sodium chloride 0.65 % nasal spray  · traZODone 50 MG tablet       Labs January 9, 2019 reviewed and provided to patient.  Follow-up in about 4 months (around 5/9/2019) for also one year physical.

## 2019-01-14 ENCOUNTER — PATIENT MESSAGE (OUTPATIENT)
Dept: INTERNAL MEDICINE | Facility: CLINIC | Age: 74
End: 2019-01-14

## 2019-01-22 ENCOUNTER — PATIENT MESSAGE (OUTPATIENT)
Dept: INTERNAL MEDICINE | Facility: CLINIC | Age: 74
End: 2019-01-22

## 2019-01-22 ENCOUNTER — TELEPHONE (OUTPATIENT)
Dept: INTERNAL MEDICINE | Facility: CLINIC | Age: 74
End: 2019-01-22

## 2019-01-22 RX ORDER — ESCITALOPRAM OXALATE 10 MG/1
10 TABLET ORAL NIGHTLY
Qty: 90 TABLET | Refills: 3 | Status: SHIPPED | OUTPATIENT
Start: 2019-01-22 | End: 2020-01-22

## 2019-01-22 NOTE — TELEPHONE ENCOUNTER
Ami Camara CL Staff   Caller: [phone] (Today, 12:53 PM)             Patient is requesting if the doctor can prescribe her the medication xanax, because the  escitalopram oxalate (LEXAPRO) 5 MG Tab is not working.     Please advise, thank you

## 2019-01-22 NOTE — TELEPHONE ENCOUNTER
----- Message from Ami Bruno sent at 1/22/2019 12:53 PM CST -----  Contact: [phone]  Patient is requesting if the doctor can prescribe her the medication xanax, because the  escitalopram oxalate (LEXAPRO) 5 MG Tab is not working.    Please advise, thank you

## 2019-01-22 NOTE — TELEPHONE ENCOUNTER
Dear Niki,  I recommend she increase lexapro to 10 mg nightly and I have sent a new prescription for the 10 mg tablet to her [facility] mail order pharmacy. Lexapro 5 mg daily is often not enough to relieve anxiety.  Xanax is no longer recommended for anxiety. She will gradually feel less anxious taking lexapro regularly.  Sincerely, Dr. Hoa Orantes

## 2019-01-23 ENCOUNTER — TELEPHONE (OUTPATIENT)
Dept: PULMONOLOGY | Facility: CLINIC | Age: 74
End: 2019-01-23

## 2019-01-23 DIAGNOSIS — R06.02 SOB (SHORTNESS OF BREATH): Primary | ICD-10-CM

## 2019-01-23 NOTE — TELEPHONE ENCOUNTER
Telephone call to patient  She sounds depressed on the phone  She says that she wants to, packet in in .    She reports that she has not left her apartments since she came to the office to see me with her brother Riley on January 9th.   She is having more trouble with everything.  She is having trouble with her oxygen not working correctly.  She is more short of breath and weaker.  Her brother Riley is currently in [place] and will be returning on Tuesday.  She has put in an application to move into [facility] and next week a representative from [facility] will visit her in her apartment to do an intake evaluation.  In the building where she is currently living she has friends.  I explained to the patient that I do not think Xanax is a safe medication for her to take and I will not prescribe Xanax for her.  Has had  She is taking Lexapro 10 mg once daily.  I recommend she call her oxygen supplier today to try to get her machine working correctly.  I recommend that she meet with a mental health care professional to develop strategies to manage anxiety and also that each day she plan to meet with a friend within her building, even if it is just for lunch.  It is important to get out of her apartment every day.  If she is not able to do this.  I recommend she visit with friends on the telephone.

## 2019-01-23 NOTE — TELEPHONE ENCOUNTER
Spoke with pt advised that PCP would like pt to increase dosage of lexapro to 10 mg nightly. Pt states she has done that already and it did not work. Had Xanax 25 mg  left over and has been taking 1/2 a tablet everyday and it has been helping.     Please advise

## 2019-02-06 ENCOUNTER — OFFICE VISIT (OUTPATIENT)
Dept: PULMONOLOGY | Facility: CLINIC | Age: 74
End: 2019-02-06
Payer: MEDICARE

## 2019-02-06 ENCOUNTER — HOSPITAL ENCOUNTER (OUTPATIENT)
Dept: PULMONOLOGY | Facility: CLINIC | Age: 74
Discharge: HOME | End: 2019-02-06
Payer: MEDICARE

## 2019-02-06 VITALS
SYSTOLIC BLOOD PRESSURE: 139 MMHG | BODY MASS INDEX: 31.92 KG/M2 | HEIGHT: 64 IN | HEIGHT: 64 IN | WEIGHT: 187 LBS | HEART RATE: 77 BPM | DIASTOLIC BLOOD PRESSURE: 72 MMHG | BODY MASS INDEX: 31.92 KG/M2 | OXYGEN SATURATION: 95 % | WEIGHT: 187 LBS

## 2019-02-06 DIAGNOSIS — J84.10 PULMONARY FIBROSIS: Chronic | ICD-10-CM

## 2019-02-06 DIAGNOSIS — R06.02 SOB (SHORTNESS OF BREATH): ICD-10-CM

## 2019-02-06 DIAGNOSIS — J96.11 CHRONIC RESPIRATORY FAILURE WITH HYPOXIA: ICD-10-CM

## 2019-02-06 DIAGNOSIS — J84.112 IPF (IDIOPATHIC PULMONARY FIBROSIS): Primary | ICD-10-CM

## 2019-02-06 DIAGNOSIS — Z51.5 PALLIATIVE CARE ENCOUNTER: ICD-10-CM

## 2019-02-06 LAB
PRE FEV1 FVC: 83
PRE FEV1: 0.99
PRE FVC: 1.19
PREDICTED FEV1 FVC: 77
PREDICTED FEV1: 2.12
PREDICTED FVC: 2.77

## 2019-02-06 PROCEDURE — 99499 RISK ADDL DX/OHS AUDIT: ICD-10-PCS | Mod: S$GLB,,, | Performed by: INTERNAL MEDICINE

## 2019-02-06 PROCEDURE — 94729 DIFFUSING CAPACITY: CPT | Mod: HCNC,S$GLB,, | Performed by: INTERNAL MEDICINE

## 2019-02-06 PROCEDURE — 99215 PR OFFICE/OUTPT VISIT, EST, LEVL V, 40-54 MIN: ICD-10-PCS | Mod: 25,HCNC,S$GLB, | Performed by: INTERNAL MEDICINE

## 2019-02-06 PROCEDURE — 94729 PR C02/MEMBANE DIFFUSE CAPACITY: ICD-10-PCS | Mod: HCNC,S$GLB,, | Performed by: INTERNAL MEDICINE

## 2019-02-06 PROCEDURE — 99215 OFFICE O/P EST HI 40 MIN: CPT | Mod: 25,HCNC,S$GLB, | Performed by: INTERNAL MEDICINE

## 2019-02-06 PROCEDURE — 94618 PULMONARY STRESS TESTING: CPT | Mod: HCNC,S$GLB,, | Performed by: INTERNAL MEDICINE

## 2019-02-06 PROCEDURE — 3078F DIAST BP <80 MM HG: CPT | Mod: HCNC,CPTII,S$GLB, | Performed by: INTERNAL MEDICINE

## 2019-02-06 PROCEDURE — 99499 UNLISTED E&M SERVICE: CPT | Mod: S$GLB,,, | Performed by: INTERNAL MEDICINE

## 2019-02-06 PROCEDURE — 99999 PR PBB SHADOW E&M-EST. PATIENT-LVL III: ICD-10-PCS | Mod: PBBFAC,HCNC,, | Performed by: INTERNAL MEDICINE

## 2019-02-06 PROCEDURE — 99999 PR PBB SHADOW E&M-EST. PATIENT-LVL III: CPT | Mod: PBBFAC,HCNC,, | Performed by: INTERNAL MEDICINE

## 2019-02-06 PROCEDURE — 3078F PR MOST RECENT DIASTOLIC BLOOD PRESSURE < 80 MM HG: ICD-10-PCS | Mod: HCNC,CPTII,S$GLB, | Performed by: INTERNAL MEDICINE

## 2019-02-06 PROCEDURE — 1101F PT FALLS ASSESS-DOCD LE1/YR: CPT | Mod: HCNC,CPTII,S$GLB, | Performed by: INTERNAL MEDICINE

## 2019-02-06 PROCEDURE — 94618 PULMONARY STRESS TESTING: ICD-10-PCS | Mod: HCNC,S$GLB,, | Performed by: INTERNAL MEDICINE

## 2019-02-06 PROCEDURE — 94010 BREATHING CAPACITY TEST: ICD-10-PCS | Mod: HCNC,S$GLB,, | Performed by: INTERNAL MEDICINE

## 2019-02-06 PROCEDURE — 3075F SYST BP GE 130 - 139MM HG: CPT | Mod: HCNC,CPTII,S$GLB, | Performed by: INTERNAL MEDICINE

## 2019-02-06 PROCEDURE — 3075F PR MOST RECENT SYSTOLIC BLOOD PRESS GE 130-139MM HG: ICD-10-PCS | Mod: HCNC,CPTII,S$GLB, | Performed by: INTERNAL MEDICINE

## 2019-02-06 PROCEDURE — 94010 BREATHING CAPACITY TEST: CPT | Mod: HCNC,S$GLB,, | Performed by: INTERNAL MEDICINE

## 2019-02-06 PROCEDURE — 1101F PR PT FALLS ASSESS DOC 0-1 FALLS W/OUT INJ PAST YR: ICD-10-PCS | Mod: HCNC,CPTII,S$GLB, | Performed by: INTERNAL MEDICINE

## 2019-02-06 RX ORDER — ALPRAZOLAM 0.25 MG/1
0.25 TABLET ORAL 3 TIMES DAILY
Qty: 90 TABLET | Refills: 0 | Status: SHIPPED | OUTPATIENT
Start: 2019-02-06 | End: 2019-03-08

## 2019-02-06 NOTE — PROGRESS NOTES
"Subjective:       Patient ID: Josi Sidhu is a 74 y.o. female.    Chief Complaint: Pulmonary Fibrosis    74 year old patient With ILD last seen one year prior.  Previously seen in 2011 and then followed by Dr. Barakat.  Took Esbriet for 18 months, last taken March 2016.  Reports gradual decline function until October 2018 when she began to notice a rapid decline in her pulmonary status.  Patient reports difficulty performing ADLs and using supplemental oxygen continuously which was not her norm up until that point.    Complains mostly of depression.  Has a large hiatal hernia and takes one nexium daily.  No significant reflux symptoms.  No chronic cough     Decline in respiratory status has made her extremely anxious.  She is requesting palliative care services and advice on end of life.  She denies chest pain, edema or cardiac issues.      Review of Systems   Psychiatric/Behavioral: The patient is nervous/anxious (related to dyspnea.).        Objective:       Vitals:    02/06/19 1045   BP: 139/72   Pulse: 77   SpO2: 95%  Comment: 2.0   Weight: 84.8 kg (187 lb)   Height: 5' 4" (1.626 m)     Physical Exam     Personal Diagnostic Review  none pertinent  No flowsheet data found.      Assessment:       1. IPF (idiopathic pulmonary fibrosis)    2. Chronic respiratory failure with hypoxia    3. Pulmonary fibrosis, diagnosis 2010    4. Palliative care encounter        Outpatient Encounter Medications as of 2/6/2019   Medication Sig Dispense Refill    acetaminophen (TYLENOL) 500 MG tablet Take 2 tablets (1,000 mg total) by mouth every 8 (eight) hours as needed for Pain.  0    amLODIPine (NORVASC) 5 MG tablet Take 1 tablet (5 mg total) by mouth every morning. 90 tablet 3    escitalopram oxalate (LEXAPRO) 10 MG tablet Take 1 tablet (10 mg total) by mouth nightly. Dose increased from 5 mg to 10 mg today Relieve anxiety and improve sleep 90 tablet 3    hydrocortisone butyrate 0.1 % Crea cream Apply 1 application " topically 2 (two) times daily. 45 g 2    irbesartan (AVAPRO) 300 MG tablet Take 1 tablet (300 mg total) by mouth every evening. 90 tablet 3    omeprazole (PRILOSEC) 20 MG capsule Take 1 capsule (20 mg total) by mouth every morning. 90 capsule 3    sodium chloride (OCEAN) 0.65 % nasal spray 2 sprays by Nasal route as needed for Congestion (nasal oxygen therapy). Each bottle 30 ml 3 Bottle 3    traZODone (DESYREL) 50 MG tablet Take 1 tablet (50 mg total) by mouth every evening. 90 tablet 3    ALPRAZolam (XANAX) 0.25 MG tablet Take 1 tablet (0.25 mg total) by mouth 3 (three) times daily. 90 tablet 0     Facility-Administered Encounter Medications as of 2/6/2019   Medication Dose Route Frequency Provider Last Rate Last Dose    albuterol sulfate nebulizer solution 2.5 mg  2.5 mg Nebulization 1 time in Clinic/HOD Hoa Orantes MD         Orders Placed This Encounter   Procedures    Ambulatory referral to Palliative Care - [facility]     Referral Priority:   Routine     Referral Type:   Consultation     Referred to Provider:   [facility]     Requested Specialty:   Hospice and Palliative Medicine     Number of Visits Requested:   1     Plan:     Problem List Items Addressed This Visit     Pulmonary fibrosis, diagnosis 2010 (Chronic)    Overview     Significant decline in three months.  At visit, patient wished for palliative care services.  POA is brother, Jeyson Shannon.  Wishes to be DNR          Chronic respiratory failure with hypoxia    Relevant Orders    Ambulatory referral to Palliative Care - [facility]      Other Visit Diagnoses     IPF (idiopathic pulmonary fibrosis)    -  Primary    Relevant Medications    ALPRAZolam (XANAX) 0.25 MG tablet    Other Relevant Orders    Ambulatory referral to Palliative Care - [facility]    Palliative care encounter        Relevant Medications    ALPRAZolam (XANAX) 0.25 MG tablet        Spent 45 minutes with patient and her brother offering  additional evaluation and work up for alternative dx related to her rapid decline in exercise tolerance and lung function over the past 4 months.  Patient declined evaluation of cardiac issues or chest radiographs to ensure that this was progression of her disease.  We had a long discussion regarding goals of care..  She has two children who she is still close to but her daughter and son are estranged from each other, she has therefore designated her brother POA who lives on the [facility] [place]. Advance Care Planning    I have asked her to return the 5 wishes for our review but noted that she wants to be DNR. She will speak to both children and explain her wishes and POA.       She has previously used a 1/2 tablet of 0.25mg of xanax for anxiety related to dyspnea which has helped.  Due to palliative care wishes, will prescribe.

## 2019-02-07 ENCOUNTER — PATIENT MESSAGE (OUTPATIENT)
Dept: INTERNAL MEDICINE | Facility: CLINIC | Age: 74
End: 2019-02-07

## 2019-02-07 DIAGNOSIS — Z11.1 SCREENING-PULMONARY TB: ICD-10-CM

## 2019-02-08 ENCOUNTER — LAB VISIT (OUTPATIENT)
Dept: LAB | Facility: HOSPITAL | Age: 74
End: 2019-02-08
Attending: INTERNAL MEDICINE
Payer: MEDICARE

## 2019-02-08 DIAGNOSIS — Z11.1 SCREENING-PULMONARY TB: ICD-10-CM

## 2019-02-08 PROCEDURE — 86480 TB TEST CELL IMMUN MEASURE: CPT | Mod: HCNC

## 2019-02-08 PROCEDURE — 36415 COLL VENOUS BLD VENIPUNCTURE: CPT | Mod: HCNC

## 2019-02-08 NOTE — PROCEDURES
Josi Sidhu is a 74 y.o.  female patient, who presents for a 6 minute walk test ordered by Adriana Mitchell MD.  The diagnosis is Pulmonary Fibrosis.  The patient's BMI is 32.2 kg/m2.  Predicted distance (lower limit of normal) is 239.82 meters.      Test Results:    The test was completed without stopping.  The total time walked was 360 seconds.  During walking, the patient reported:  Dyspnea.  The patient used a wheelchair for assistance and supplemental oxygen during testing.     02/06/2019---------Distance: 121.92 meters (400 feet)     O2 Sat % Supplemental Oxygen Heart Rate Blood Pressure Andrew Scale   Pre-exercise  (Resting) 92 % 2 L/M 76 bpm 140/71 mmHg 1   During Exercise 87 % 2 L/M 86 bpm 139/72 mmHg 3   Post-exercise  (Recovery) 90 % 2 L/M  79 bpm       Recovery Time:  126 seconds    Performing nurse/tech:  Arleen STAFFORD      PREVIOUS STUDY:   01/12/2018---------Distance: 304.8 meters (1000 feet)       O2 Sat % Supplemental Oxygen Heart Rate Blood Pressure Andrew Scale   Pre-exercise  (Resting) 95 % Room Air 105 bpm 133/62 mmHg 3   During Exercise 84 % Room Air 109 bpm 145/66 mmHg 5-6   Post-exercise    93 % Room Air  111 bpm             CLINICAL INTERPRETATION:  Six minute walk distance is 121.92 meters (400 feet) with moderate dyspnea.  During exercise, there was significant desaturation while breathing supplemental oxygen.  Both blood pressure and heart rate remained stable with walking.  The patient did not report non-pulmonary symptoms during exercise.  Severe exercise impairment is likely due to desaturation and subjective symptoms.  The patient did complete the study, walking 360 seconds of the 360 second test.  Since the previous study in January 2018, exercise capacity is significantly worse.  Based upon age and body mass index, exercise capacity is less than predicted.

## 2019-02-11 LAB
M TB IFN-G CD4+ BCKGRND COR BLD-ACNC: -0.04 IU/ML
MITOGEN IGNF BCKGRD COR BLD-ACNC: >10 IU/ML
MITOGEN IGNF BCKGRD COR BLD-ACNC: NEGATIVE [IU]/ML
NIL: 0.08 IU/ML
TB2 - NIL: -0.01 IU/ML

## 2019-02-13 ENCOUNTER — TELEPHONE (OUTPATIENT)
Dept: INTERNAL MEDICINE | Facility: CLINIC | Age: 74
End: 2019-02-13

## 2019-02-14 ENCOUNTER — PATIENT MESSAGE (OUTPATIENT)
Dept: INTERNAL MEDICINE | Facility: CLINIC | Age: 74
End: 2019-02-14

## 2019-02-14 NOTE — TELEPHONE ENCOUNTER
Called to inform Malgorzata that I re faxed the pt forms for home health and she stated that they only receive the cover page. Malgorzata will come by on tomorrow to  the forms, will leave forms on Chantel desk.

## 2019-02-14 NOTE — TELEPHONE ENCOUNTER
----- Message from Nichelle Richards sent at 2/14/2019  4:10 PM CST -----  Contact: [facility] /  Malgorzata [phone]  She did not receive the fax at [phone] and she is giving different fax # for you to refax the papers to: [phone] fax#.    Thank you

## 2019-02-14 NOTE — TELEPHONE ENCOUNTER
----- Message from Grecia Buckner sent at 2/14/2019  8:29 AM CST -----  Contact: self/[phone]  Pt is calling to speak with someone in the office in regards to getting a call back. Pt states that she has left several message in regards to her nursing home papers that need to be signed. Pt states that she has until Monday to get them signed and knows  won't be in until Tuesday. Pt states that this is very urgent that it gets done today. Pt is going to have the nursing home fax over the documents again today. Please advise.          Thank You

## 2019-02-14 NOTE — TELEPHONE ENCOUNTER
Dear Radha,  Please let Josi know all her paper work is completed and faxed to [facility]. Thank you very much.  Sincerely, Dr. Hoa Orantes

## 2019-06-24 ENCOUNTER — TELEPHONE (OUTPATIENT)
Dept: INTERNAL MEDICINE | Facility: CLINIC | Age: 74
End: 2019-06-24

## 2020-02-26 ENCOUNTER — PES CALL (OUTPATIENT)
Dept: ADMINISTRATIVE | Facility: CLINIC | Age: 75
End: 2020-02-26

## 2020-10-05 ENCOUNTER — PATIENT MESSAGE (OUTPATIENT)
Dept: ADMINISTRATIVE | Facility: HOSPITAL | Age: 75
End: 2020-10-05

## 2021-01-04 ENCOUNTER — PATIENT MESSAGE (OUTPATIENT)
Dept: ADMINISTRATIVE | Facility: HOSPITAL | Age: 76
End: 2021-01-04

## 2021-04-05 ENCOUNTER — PATIENT MESSAGE (OUTPATIENT)
Dept: ADMINISTRATIVE | Facility: HOSPITAL | Age: 76
End: 2021-04-05

## 2021-07-06 ENCOUNTER — PATIENT MESSAGE (OUTPATIENT)
Dept: ADMINISTRATIVE | Facility: HOSPITAL | Age: 76
End: 2021-07-06

## 2021-10-04 ENCOUNTER — PATIENT MESSAGE (OUTPATIENT)
Dept: ADMINISTRATIVE | Facility: HOSPITAL | Age: 76
End: 2021-10-04

## 2023-06-09 NOTE — LETTER
February 20, 2017    Josi POWER Thea  [address] [facility] [place] [address]             [facility]  [address] [facility] [address] Dear ,    I work with [facility]'s Outpatient Case Management Department. I have been unsuccessful at reaching you to follow-up to see how you have been doing. Please call me back at your earliest convenience to discuss your health care needs.      I can be reached at [phone] from 8:00AM to 4:30 PM on Monday thru Friday. [facility] also has a program where a nurse is available 24/7 to answer questions or provide medical advice, their number is [phone].    Thanks,      Aiyana GONZALEZ RN  Outpatient Case Management       
No

## 2023-12-27 NOTE — TELEPHONE ENCOUNTER
----- Message from [place] sent at 1/8/2019  3:40 PM CST -----  Contact: Patient [phone]  Patient stating has a follow up appt for tomorrow at 1:00 pm would like to have Labs done as well, not sure if it will be fasting or non fasting, requesting a call back to inform which type of labs will be done,    Also would like a call to inform if labs are needed that the order will be in prior to 1:00pm appt.    Please call an advise  Thank you    Please place lab orders to be scheduled before patients appointment  
----- Message from [place] sent at 1/8/2019  3:40 PM CST -----  Contact: Patient [phone]  Patient stating has a follow up appt for tomorrow at 1:00 pm would like to have Labs done as well, not sure if it will be fasting or non fasting, requesting a call back to inform which type of labs will be done,    Also would like a call to inform if labs are needed that the order will be in prior to 1:00pm appt.    Please call an advise  Thank you    
Dr Higuera